# Patient Record
Sex: MALE | Race: WHITE | Employment: OTHER | URBAN - METROPOLITAN AREA
[De-identification: names, ages, dates, MRNs, and addresses within clinical notes are randomized per-mention and may not be internally consistent; named-entity substitution may affect disease eponyms.]

---

## 2017-03-01 ENCOUNTER — HOSPITAL ENCOUNTER (OUTPATIENT)
Dept: CARDIAC REHAB | Age: 82
Discharge: HOME | End: 2017-03-01

## 2017-03-08 ENCOUNTER — HOSPITAL ENCOUNTER (OUTPATIENT)
Dept: CARDIAC REHAB | Age: 82
Discharge: HOME | End: 2017-03-08
Payer: MEDICARE

## 2017-03-08 VITALS — BODY MASS INDEX: 26.04 KG/M2 | WEIGHT: 186 LBS | HEIGHT: 71 IN

## 2017-03-08 PROCEDURE — G0239 OTH RESP PROC, GROUP: HCPCS

## 2017-03-09 ENCOUNTER — APPOINTMENT (OUTPATIENT)
Dept: CARDIAC REHAB | Age: 82
End: 2017-03-09
Payer: MEDICARE

## 2017-03-14 ENCOUNTER — HOSPITAL ENCOUNTER (OUTPATIENT)
Dept: CARDIAC REHAB | Age: 82
Discharge: HOME | End: 2017-03-14
Payer: MEDICARE

## 2017-03-14 VITALS — BODY MASS INDEX: 25.8 KG/M2 | WEIGHT: 185 LBS

## 2017-03-14 PROCEDURE — G0239 OTH RESP PROC, GROUP: HCPCS

## 2017-03-16 ENCOUNTER — HOSPITAL ENCOUNTER (OUTPATIENT)
Dept: CARDIAC REHAB | Age: 82
Discharge: HOME | End: 2017-03-16
Payer: MEDICARE

## 2017-03-16 PROCEDURE — G0239 OTH RESP PROC, GROUP: HCPCS

## 2017-03-21 ENCOUNTER — HOSPITAL ENCOUNTER (OUTPATIENT)
Dept: CARDIAC REHAB | Age: 82
Discharge: HOME | End: 2017-03-21
Payer: MEDICARE

## 2017-03-21 VITALS — WEIGHT: 184 LBS | BODY MASS INDEX: 25.66 KG/M2

## 2017-03-21 PROCEDURE — G0239 OTH RESP PROC, GROUP: HCPCS

## 2017-03-23 ENCOUNTER — HOSPITAL ENCOUNTER (OUTPATIENT)
Dept: CARDIAC REHAB | Age: 82
Discharge: HOME | End: 2017-03-23
Payer: MEDICARE

## 2017-03-23 VITALS — BODY MASS INDEX: 25.66 KG/M2 | WEIGHT: 184 LBS

## 2017-03-23 PROCEDURE — G0239 OTH RESP PROC, GROUP: HCPCS

## 2017-03-28 ENCOUNTER — HOSPITAL ENCOUNTER (OUTPATIENT)
Dept: CARDIAC REHAB | Age: 82
Discharge: HOME | End: 2017-03-28
Payer: MEDICARE

## 2017-03-28 VITALS — WEIGHT: 183.8 LBS | BODY MASS INDEX: 25.63 KG/M2

## 2017-03-28 PROCEDURE — G0239 OTH RESP PROC, GROUP: HCPCS

## 2017-03-28 NOTE — PROGRESS NOTES
Nutrition Assessment:     Client History:  Current Medical Diagnosis: COPD, SOB, JACKY, afib, HTN, cholesterol     Pt reports 2 years ago attempting to lose weight and being successful going from 210# to 190#. He reports since then he has had unintentional weight loss and according to his home scales is 175#. He reports a desire to maintain his current weight and prevent additional weight loss. Recently his weight has been stable. Pt reports his appetite has been low compared to his normal and he seems to get full quickly. He has had high potassium levels and has been watching the potassium levels in foods. Dietary recall:  Breakfast: 9:30am cereal or toast/eggs or egg/grits coffee, prune juice  Lunch: 1-2pm sandwich (bolangna, ham, cheese, etc), or leftovers. Water, diet apple  Juice  Snack: occasionally a cookie  Dinner: 6:30pm stuffed bell peppers, [place] toast, salad. Chicken and potatoes, porchop and rice with salad, broccoli. Will eat out 2-3x/week - [place], [employer], [employer], etc.    Desserts: cake, pie, ice cream,     Anthropometric Data:  Ht: 71\"   Wt: 186#   Age: 80  BMI: 25.9kg/m2 normal for age  IBW: 184#   %IBW: 101%   % body fat:28.7%  Waist measurement: 40.5\"     Estimated Nutritional Needs:  Calories: 2031 (MSJ*1.3)  Protein:  66-83 grams (0.8-1.0g/kg IBW)    Nutrition Diagnosis:  Food and nutrition knowledge deficit related to limited education on diet and disease conditions as evidenced by pt report. Nutrition Intervention:  Nutrition Education:  - Educated pt on techniques for avoiding weight loss. Discussed relationship between pulmonary disease and unintentional weight loss. Reviewed eating slowly, small frequent meals, etc. As a means of encouraging intake. - Reviewed healthful dietary practices that assist with heart and lung health: high fiber, whole grains, fruits, veggies, lean protein, etc.    - Reviewed low sodium diet.   - Reviewed dietary improvements for lowering cholesterol     Handouts: Cholesterol Food Guide, COPD Nutrition Basics        Monitoring/Evaluation:  Follow-up Appointment: RD to follow up with pt during pulmonary rehab sessions for questions and assessment of progression towards goals.      Allan Mijares MS, RD, LD  C: [phone]

## 2017-03-30 ENCOUNTER — HOSPITAL ENCOUNTER (OUTPATIENT)
Dept: CARDIAC REHAB | Age: 82
Discharge: HOME | End: 2017-03-30
Payer: MEDICARE

## 2017-03-30 PROCEDURE — G0239 OTH RESP PROC, GROUP: HCPCS

## 2017-04-04 ENCOUNTER — HOSPITAL ENCOUNTER (OUTPATIENT)
Dept: CARDIAC REHAB | Age: 82
Discharge: HOME | End: 2017-04-04
Payer: MEDICARE

## 2017-04-04 VITALS — BODY MASS INDEX: 25.8 KG/M2 | WEIGHT: 185 LBS

## 2017-04-04 PROCEDURE — G0239 OTH RESP PROC, GROUP: HCPCS

## 2017-04-06 ENCOUNTER — HOSPITAL ENCOUNTER (OUTPATIENT)
Dept: CARDIAC REHAB | Age: 82
Discharge: HOME | End: 2017-04-06
Payer: MEDICARE

## 2017-04-06 PROCEDURE — G0239 OTH RESP PROC, GROUP: HCPCS

## 2017-04-11 ENCOUNTER — HOSPITAL ENCOUNTER (OUTPATIENT)
Dept: CARDIAC REHAB | Age: 82
Discharge: HOME | End: 2017-04-11
Payer: MEDICARE

## 2017-04-11 VITALS — BODY MASS INDEX: 26.01 KG/M2 | HEIGHT: 71 IN | WEIGHT: 185.8 LBS

## 2017-04-11 PROCEDURE — G0239 OTH RESP PROC, GROUP: HCPCS

## 2017-04-13 ENCOUNTER — HOSPITAL ENCOUNTER (OUTPATIENT)
Dept: CARDIAC REHAB | Age: 82
Discharge: HOME | End: 2017-04-13
Payer: MEDICARE

## 2017-04-13 PROCEDURE — G0239 OTH RESP PROC, GROUP: HCPCS

## 2017-04-18 ENCOUNTER — HOSPITAL ENCOUNTER (OUTPATIENT)
Dept: CARDIAC REHAB | Age: 82
Discharge: HOME | End: 2017-04-18
Payer: MEDICARE

## 2017-04-18 VITALS — BODY MASS INDEX: 26.08 KG/M2 | WEIGHT: 187 LBS

## 2017-04-18 PROCEDURE — G0239 OTH RESP PROC, GROUP: HCPCS

## 2017-04-20 ENCOUNTER — HOSPITAL ENCOUNTER (OUTPATIENT)
Dept: CARDIAC REHAB | Age: 82
Discharge: HOME | End: 2017-04-20
Payer: MEDICARE

## 2017-04-25 ENCOUNTER — APPOINTMENT (OUTPATIENT)
Dept: CARDIAC REHAB | Age: 82
End: 2017-04-25
Payer: MEDICARE

## 2018-02-13 ENCOUNTER — HOSPITAL ENCOUNTER (OUTPATIENT)
Dept: PHYSICAL THERAPY | Age: 83
Discharge: HOME | End: 2018-02-13
Payer: MEDICARE

## 2018-02-13 PROCEDURE — G8978 MOBILITY CURRENT STATUS: HCPCS

## 2018-02-13 PROCEDURE — 97110 THERAPEUTIC EXERCISES: CPT

## 2018-02-13 PROCEDURE — 97161 PT EVAL LOW COMPLEX 20 MIN: CPT

## 2018-02-13 PROCEDURE — G8979 MOBILITY GOAL STATUS: HCPCS

## 2018-02-13 NOTE — THERAPY EVALUATION
Flower Leung  : 1933  [place] / [place] / Product Type: Medicare /  [address]  at [facility]  [address] , [address], [address] [place] [address]  [phone]   [phone]       OUTPATIENT PHYSICAL THERAPY:Initial Assessment 2018    ICD-10: Treatment Diagnosis: Pain in right knee (M25.561), Muscle weakness (generalized) (M62.81)  Precautions/Allergies:   Neuromuscular blockers, steroidal and Sulfa (sulfonamide antibiotics)   Fall Risk Score: 2 (? 5 = High Risk)  MD Orders: PT eval and treat MEDICAL/REFERRING DIAGNOSIS:  KNEE, RIGHT   DATE OF ONSET: 2-3 months ago  REFERRING PHYSICIAN: Miguel Angel Bain MD  RETURN PHYSICIAN APPOINTMENT: not specified     ASSESSMENT:  Mr. Ascencion Guzman presents with recent onset of R knee pain. He has been treated with injections and reports he is feeling about 80-85% back to normal.  Pt demonstrates decreased muscle strength, slight difficulty with stairs and mild difficulty with repeated sit<>stand. Pt has COPD and also demonstrates decreased endurance. Pt will benefit from 1 visit to review and solidify his HEP. PROBLEM LIST (Impacting functional limitations):  1. Decreased Strength  2. Decreased ADL/Functional Activities  3. Increased Pain  4. Increased Fatigue  5. Increased Shortness of Breath INTERVENTIONS PLANNED:  1. Balance Exercise  2. Cold  3. Cryotherapy  4. Heat  5. Home Exercise Program (HEP)  6. Neuromuscular Re-education/Strengthening  7. Range of Motion (ROM)  8. Therapeutic Activites  9. Therapeutic Exercise/Strengthening   TREATMENT PLAN:  Effective Dates: 18 TO 3/31/18. Frequency/Duration: 1 visit  GOALS: (Goals have been discussed and agreed upon with patient.)  Short-Term Functional Goals = Discharge Goals: Time Frame: 1 visit  1. Pt will participate in 1 PT visit for education on home exercises  2. Pt will be I and compliant with long term HEP  3.  Pt will demonstrate proper form with sit<>stand  Rehabilitation Potential For Stated Goals: Good  Regarding Ryan Peck's therapy, I certify that the treatment plan above will be carried out by a therapist or under their direction. Thank you for this referral,  Gail Gomes PT                 The information in this section was collected on 2/13/18 (except where otherwise noted). HISTORY:   History of Present Injury/Illness (Reason for Referral): Dakota Bell presents with R knee pain for the last 2-3 months, insidious onset. Saw PCP and then ortho, had x-rays and pt reports he was dx with arthritis. Pt reports he had an injection approx 1 week ago and it's about 80-85% better. Pt is interested in home exercise program.    Past Medical History/Comorbidities:   Mr. Arabella Lopez  has a past medical history of Atrial fibrillation ([facility] [address] Cancer ([address] Chronic obstructive pulmonary disease (HCC); COPD (chronic obstructive pulmonary disease) ([address] (5/13/2016); Erectile dysfunction; Essential hypertension (5/13/2016); Hypercholesterolemia; Hyperlipidemia (5/13/2016); Hypertension; Malignant neoplasm of prostate ([address] (5/13/2016); Paroxysmal atrial fibrillation ([address] (5/13/2016); Peripheral vascular disease ([address] Shortness of breath dyspnea (5/13/2016); and Unspecified sleep apnea. Mr. Arabella Lopez  has a past surgical history that includes hx vasectomy; hx orthopaedic (1996); and hx colonoscopy.     Social History/Living Environment:   Lives with wife    Prior Level of Function/Work/Activity: Retired - does most of housework and cooking due to wife being in poor health; enjoys bowling, walks his dog    Functional Limitations: Sit<>stand, standing from toilet    Current Medications:       Current Outpatient Prescriptions:     MULTAQ tab tablet, TAKE 1 TABLET TWICE A DAY WITH MEALS, Disp: 180 Tab, Rfl: 2    fluocinonide (LIDEX) 0.05 % topical gel, Apply  to affected area two (2) times a day., Disp: , Rfl:     bimatoprost (LUMIGAN) 0.01 % ophthalmic drops, Administer 1 Drop to both eyes every evening., Disp: , Rfl:     atorvastatin (LIPITOR) 40 mg tablet, Take  by mouth daily. , Disp: , Rfl:     amLODIPine-benazepril (LOTREL) 5-10 mg per capsule, Take 1 Cap by mouth daily. Indications: 2.5-10 mg qd, Disp: , Rfl:     rivaroxaban (XARELTO) 20 mg tab tablet, Take  by mouth daily. , Disp: , Rfl:     albuterol (PROAIR HFA) 90 mcg/actuation inhaler, Take  by inhalation. , Disp: , Rfl:     fluticasone-salmeterol (ADVAIR DISKUS) 250-50 mcg/dose diskus inhaler, Take 1 puff by inhalation every twelve (12) hours. , Disp: , Rfl:     tiotropium (SPIRIVA WITH HANDIHALER) 18 mcg inhalation capsule, Take 1 capsule by inhalation daily. , Disp: , Rfl:    Date Last Reviewed:  2/13/2018     Number of Personal Factors/Comorbidities that affect the Plan of Care: 0: LOW COMPLEXITY   EXAMINATION:     ROM: B LEs WNL    Strength:   LE MMT R L   Hip Flexion 5 5   Hip ER 4 4   Hip Abduction 4+ 4+   Hip Extension 3+ 3+   Knee Flexion 5 5   Knee Extension 4 5     Functional Measures:  Stairs: slight apprehension with leading R and descending L. Impaired endurance noted due to COPD. Body Structures Involved:  1. Joints  2. Muscles Body Functions Affected:  1. Neuromusculoskeletal  2. Movement Related Activities and Participation Affected:  1. General Tasks and Demands  2. Mobility   Number of elements that affect the Plan of Care: 4+: HIGH COMPLEXITY   CLINICAL PRESENTATION:   Presentation: Stable and uncomplicated: LOW COMPLEXITY   CLINICAL DECISION MAKING:   Outcome Measure: Tool Used: Lower Extremity Functional Scale (LEFS)  Score:  Initial: 55/80 Most Recent: X/80 (Date: -- )   Interpretation of Score: 20 questions each scored on a 5 point scale with 0 representing \"extreme difficulty or unable to perform\" and 4 representing \"no difficulty\". The lower the score, the greater the functional disability. 80/80 represents no disability.   Minimal detectable change is 9 points. Score 80 79-63 62-48 47-32 31-16 15-1 0   Modifier CH CI CJ CK CL CM CN     ? Mobility - Walking and Moving Around:     - CURRENT STATUS: CJ - 20%-39% impaired, limited or restricted    - GOAL STATUS: CJ - 20%-39% impaired, limited or restricted    - D/C STATUS:  ---------------To be determined---------------      Medical Necessity:   · Patient demonstrates good rehab potential due to decreased strength and higher previous functional level. Reason for Services/Other Comments:  · Patient continues to require skilled intervention due to decreased functional status. Use of outcome tool(s) and clinical judgement create a POC that gives a: Clear prediction of patient's progress: LOW COMPLEXITY            TREATMENT:   (In addition to Assessment/Re-Assessment sessions the following treatments were rendered)      Present Symptoms/Complaints - 2/13/2018:  See hx  Pain: Initial:     0/10 Post Session:  0/10       Therapeutic Exercise: (15 minutes):  Exercises per grid below to improve mobility, strength and balance. Required moderate visual, verbal and tactile cues to promote proper body alignment, promote proper body posture and promote proper body mechanics. Progressed resistance, range and repetitions as indicated. Date:  2/13/18 Date:   Date:     Activity/Exercise Parameters Parameters Parameters   Pt education Findings, anatomy/pathology, POC, HEP     Sit<>stand 10x     Clamshell 20x     Bridge 20x     Seated LAQ GTB x20                   Treatment/Session Assessment:  Pt understood educational interventions and agreed with plan. · Compliance with Program/Exercises: Will assess as treatment progresses. · Recommendations/Intent for next treatment session: Next visit will focus on advancements to more challenging activities.   · Variance from plan of care: None    Total Treatment Duration:  PT Patient Time In/Time Out  Time In: 1345  Time Out: [address], PT

## 2018-02-13 NOTE — PROGRESS NOTES
Ambulatory/Rehab Services H2 Model Falls Risk Assessment    Risk Factor Pts. ·   Confusion/Disorientation/Impulsivity  []    4 ·   Symptomatic Depression  []   2 ·   Altered Elimination  []   1 ·   Dizziness/Vertigo  []   1 ·   Gender (Male)  [x]   1 ·   Any administered antiepileptics (anticonvulsants):  []   2 ·   Any administered benzodiazepines:  []   1 ·   Visual Impairment (specify):  []   1 ·   Portable Oxygen Use  []   1 ·   Orthostatic ? BP  []   1 ·   History of Recent Falls (within 3 mos.)  []   5     Ability to Rise from Chair (choose one) Pts. ·   Ability to rise in a single movement  []   0 ·   Pushes up, successful in one attempt  [x]   1 ·   Multiple attempts, but successful  []   3 ·   Unable to rise without assistance  []   4   Total: (5 or greater = High Risk) 2     Falls Prevention Plan:   []                Physical Limitations to Exercise (specify):   []                Mobility Assistance Device (type):   []                Exercise/Equipment Adaptation (specify):    ©2010 [facility] [address] Patent #[id number].  Federal Law prohibits the replication, distribution or use without written permission from [facility] [employer]

## 2018-02-20 ENCOUNTER — HOSPITAL ENCOUNTER (OUTPATIENT)
Dept: PHYSICAL THERAPY | Age: 83
Discharge: HOME | End: 2018-02-20
Payer: MEDICARE

## 2018-02-20 NOTE — THERAPY DISCHARGE
Jenny Meredith  : 1933  [place] / [place] / Product Type: Medicare /  [address]  at [facility]  [address], [address], [address] [place] [address]  [phone]   [phone]       OUTPATIENT PHYSICAL THERAPY:Discontinuation Summary 2018    ICD-10: Treatment Diagnosis: Pain in right knee (M25.561), Muscle weakness (generalized) (M62.81)  Precautions/Allergies:   Neuromuscular blockers, steroidal and Sulfa (sulfonamide antibiotics)   Fall Risk Score: 2 (? 5 = High Risk)  MD Orders: PT eval and treat MEDICAL/REFERRING DIAGNOSIS:  KNEE, RIGHT   DATE OF ONSET: 2-3 months ago  REFERRING PHYSICIAN: Tonio Xiong MD  RETURN PHYSICIAN APPOINTMENT: not specified     ASSESSMENT:  Mr. Nikolas Yanez participated in PT evaluation on 18 and was given HEP. He presented to therapy for his appointment today, however he stated he has been having stomach issues today and declined performing or reviewing exercises. Pt states his knee is feeling great and he thinks the injection and the exercises have helped. Pt filled out LEFS today and score has gone up from 55/80 to 70/80. Pt is discharged from PT. TREATMENT PLAN:  Effective Dates: 18 TO 3/31/18. Frequency/Duration: 1 visit  GOALS: (Goals have been discussed and agreed upon with patient.)  Short-Term Functional Goals = Discharge Goals: Time Frame: 1 visit  1. Pt will participate in 1 PT visit for education on home exercises - met  2. Pt will be I and compliant with long term HEP - met  3. Pt will demonstrate proper form with sit<>stand - unable to assess      Outcome Measure: Tool Used: Lower Extremity Functional Scale (LEFS)  Score:  Initial: 55/80 Most Recent: 70/80 (Date: 18 )   Interpretation of Score: 20 questions each scored on a 5 point scale with 0 representing \"extreme difficulty or unable to perform\" and 4 representing \"no difficulty\".   The lower the score, the greater the functional disability. 80/80 represents no disability. Minimal detectable change is 9 points.

## 2019-06-04 ENCOUNTER — HOSPITAL ENCOUNTER (OUTPATIENT)
Dept: PHYSICAL THERAPY | Age: 84
Discharge: HOME | End: 2019-06-04
Payer: MEDICARE

## 2019-06-04 PROCEDURE — 97161 PT EVAL LOW COMPLEX 20 MIN: CPT

## 2019-06-04 NOTE — THERAPY EVALUATION
Bryant Rodolfo  : 1933  [place]  Secondary: [address] [facility]  [address] , [address], [address]  [phone]   [phone]          OUTPATIENT PHYSICAL THERAPY:Initial Assessment and Discharge Summary 2019   ICD-10: Treatment Diagnosis: Difficulty in walking, not elsewhere classified (R26.2); Left hip pain (M25.552)  Precautions/Allergies:   Clarithromycin; Neuromuscular blockers, steroidal; and Sulfa (sulfonamide antibiotics)   TREATMENT PLAN:  Effective Dates: 2019 TO Today (2019) MEDICAL/REFERRING DIAGNOSIS:  Left hip pain [M25.552]   DATE OF ONSET:  onset of chronic pain   REFERRING PHYSICIAN: Jimena Barbosa MD MD Orders: evaluate and treat  Return MD Appointment: 2019     INITIAL ASSESSMENT:  Mr. Martir Wagner presents in therapy today with signs and symptoms indicating piriformis dysfunction. He states he is unable to attend formal PT due to being primary caretaker of his wife, so he received a comprehensive HEP focused on piriformis mobility. PROBLEM LIST (Impacting functional limitations):  1. Decreased ADL/Functional Activities  2. Decreased Ambulation Ability/Technique  3. Increased Pain  4. Decreased Activity Tolerance  5. Decreased Flexibility/Joint Mobility  6. Decreased Knowledge of Precautions  7. Decreased [place] with Home Exercise Program INTERVENTIONS PLANNED: (Treatment may consist of any combination of the following)  1. Home Exercise Program (HEP)     GOALS: (Goals have been discussed and agreed upon with patient.)  Discharge Goals:   1. Patient will verbalize and demonstrate understanding of HEP focused on piriformis mobility for optimum performance of ADLs and functional mobility.      Total Duration:  PT Patient Time In/Time Out  Time In: 1345  Time Out: 1430    Rehabilitation Potential For Stated Goals: Good  Regarding Daniel Peck's therapy, I certify that the treatment plan above will be carried out by a therapist or under their direction. Thank you for this referral,  Evie Teague PT, DPT     Referring Physician Signature: Jelani Ontiveros MD _______________________________ Date _____________     PAIN/SUBJECTIVE:   Initial: Pain Intensity 1: 2  Pain Location 1: Hip  Pain Orientation 1: Left  Post Session:  2/10   HISTORY:   History of Injury/Illness (Reason for Referral):  Patient has very chronic history of hip and back pain. Recent onset in April 2019, while lifting heavy objects. Past Medical History/Comorbidities:   Mr. Glenn Mehta  has a past medical history of Atrial fibrillation ([address] Cancer ([address] Chronic obstructive pulmonary disease ([address] COPD (chronic obstructive pulmonary disease) ([address] (5/13/2016), Erectile dysfunction, Essential hypertension (5/13/2016), Hypercholesterolemia, Hyperlipidemia (5/13/2016), Hypertension, Malignant neoplasm of prostate ([address] (5/13/2016), Paroxysmal atrial fibrillation ([address] (5/13/2016), Peripheral vascular disease ([address] Shortness of breath dyspnea (5/13/2016), and Unspecified sleep apnea. He also has no past medical history of Aneurysm ([address] Arthritis, Asthma, Autoimmune disease ([address] CAD (coronary artery disease), Chronic kidney disease, Chronic pain, Coagulation disorder ([address] Diabetes ([address] Endocarditis, GERD (gastroesophageal reflux disease), Heart failure ([address] Liver disease, Morbid obesity ([address] Psychiatric disorder, PUD (peptic ulcer disease), Rheumatic fever, Seizures ([address] Stroke ([address] Thromboembolus ([address] or Thyroid disease. Mr. Glenn Mehta  has a past surgical history that includes hx vasectomy; hx orthopaedic (1996); and hx colonoscopy. Social History/Living Environment:     Independent - states he must take care of his wife who has dementia and blindness   Prior Level of Function/Work/Activity:  Retired   Personal Factors:          Sex:  male        Age:  80 y.o.    Ambulatory/Rehab Services H2 Model Falls Risk Assessment   Risk Factors:       (1)  Gender [Male] Ability to Rise from Chair:       (0)  Ability to rise in a single movement   Falls Prevention Plan:       No modifications necessary   Total: (5 or greater = High Risk): 1   ©2010 [facility]. All Rights Reserved. [facility] Patent #[id number]. Federal Law prohibits the replication, distribution or use without written permission from [facility] [employer]   Current Medications:       Current Outpatient Medications:     dronedarone (MULTAQ) tab tablet, TAKE 1 TABLET TWICE A DAY WITH MEALS, Disp: 180 Tab, Rfl: 3    apixaban (ELIQUIS) 2.5 mg tablet, Take 2.5 mg by mouth two (2) times a day., Disp: , Rfl:     naproxen sodium (ALEVE) 220 mg cap, Take  by mouth., Disp: , Rfl:     amLODIPine-benazepril (LOTREL) 2.5-10 mg per capsule, Take 1 Cap by mouth every other day., Disp: , Rfl:     fluocinonide (LIDEX) 0.05 % topical gel, Apply  to affected area two (2) times a day., Disp: , Rfl:     bimatoprost (LUMIGAN) 0.01 % ophthalmic drops, Administer 1 Drop to both eyes every evening., Disp: , Rfl:     atorvastatin (LIPITOR) 40 mg tablet, Take  by mouth daily. , Disp: , Rfl:     albuterol (PROAIR HFA) 90 mcg/actuation inhaler, Take  by inhalation. , Disp: , Rfl:     fluticasone-salmeterol (ADVAIR DISKUS) 250-50 mcg/dose diskus inhaler, Take 1 puff by inhalation every twelve (12) hours. , Disp: , Rfl:     tiotropium (SPIRIVA WITH HANDIHALER) 18 mcg inhalation capsule, Take 1 capsule by inhalation daily. , Disp: , Rfl:    Date Last Reviewed:  6/5/2019     Number of Personal Factors/Comorbidities that affect the Plan of Care: 0: LOW COMPLEXITY   EXAMINATION:   Observation/Orthostatic Postural Assessment:          Patient appears in healthy condition, rises well from seated position   Palpation:          Tenderness over R gluteal and piriformis region   ROM:          Hip flexion and abduction are [place] [place] on RLE  Strength: 4+/5 hip flexion, 4/5 hip abduction on R  Special Tests:          Passive piriformis loading/stretching reproduces patient's symptoms    Body Structures Involved:  1. Nerves  2. Joints  3. Muscles Body Functions Affected:  1. Sensory/Pain  2. Movement Related Activities and Participation Affected:  1. General Tasks and Demands  2. Mobility  3. Interpersonal Interactions and Relationships  4.  Community, Social and [place]   Number of elements (examined above) that affect the Plan of Care: 1-2: LOW COMPLEXITY   CLINICAL PRESENTATION:   Presentation: Stable and uncomplicated: LOW COMPLEXITY   CLINICAL DECISION MAKING:   Use of outcome tool(s) and clinical judgement create a POC that gives a: Clear prediction of patient's progress: LOW COMPLEXITY

## 2019-07-16 ENCOUNTER — HOSPITAL ENCOUNTER (OUTPATIENT)
Dept: LAB | Age: 84
Discharge: HOME | End: 2019-07-16
Payer: MEDICARE

## 2019-07-16 DIAGNOSIS — I48.0 PAROXYSMAL ATRIAL FIBRILLATION (HCC): ICD-10-CM

## 2019-07-16 LAB
ANION GAP SERPL CALC-SCNC: 9 MMOL/L (ref 7–16)
BASOPHILS # BLD: 0.1 K/UL (ref 0–0.2)
BASOPHILS NFR BLD: 1 % (ref 0–2)
BUN SERPL-MCNC: 41 MG/DL (ref 8–23)
CALCIUM SERPL-MCNC: 8.7 MG/DL (ref 8.3–10.4)
CHLORIDE SERPL-SCNC: 110 MMOL/L (ref 98–107)
CO2 SERPL-SCNC: 24 MMOL/L (ref 21–32)
CREAT SERPL-MCNC: 2.3 MG/DL (ref 0.8–1.5)
DIFFERENTIAL METHOD BLD: ABNORMAL
EOSINOPHIL # BLD: 0.6 K/UL (ref 0–0.8)
EOSINOPHIL NFR BLD: 8 % (ref 0.5–7.8)
ERYTHROCYTE [DISTWIDTH] IN BLOOD BY AUTOMATED COUNT: 14.4 % (ref 11.9–14.6)
GLUCOSE SERPL-MCNC: 79 MG/DL (ref 65–100)
HCT VFR BLD AUTO: 37.7 % (ref 41.1–50.3)
HGB BLD-MCNC: 12.3 G/DL (ref 13.6–17.2)
IMM GRANULOCYTES # BLD AUTO: 0 K/UL (ref 0–0.5)
IMM GRANULOCYTES NFR BLD AUTO: 0 % (ref 0–5)
LYMPHOCYTES # BLD: 1.4 K/UL (ref 0.5–4.6)
LYMPHOCYTES NFR BLD: 21 % (ref 13–44)
MAGNESIUM SERPL-MCNC: 1.9 MG/DL (ref 1.8–2.4)
MCH RBC QN AUTO: 30.8 PG (ref 26.1–32.9)
MCHC RBC AUTO-ENTMCNC: 32.6 G/DL (ref 31.4–35)
MCV RBC AUTO: 94.3 FL (ref 79.6–97.8)
MONOCYTES # BLD: 0.7 K/UL (ref 0.1–1.3)
MONOCYTES NFR BLD: 9 % (ref 4–12)
NEUTS SEG # BLD: 4.2 K/UL (ref 1.7–8.2)
NEUTS SEG NFR BLD: 61 % (ref 43–78)
NRBC # BLD: 0 K/UL (ref 0–0.2)
PLATELET # BLD AUTO: 199 K/UL (ref 150–450)
PMV BLD AUTO: 9.6 FL (ref 9.4–12.3)
POTASSIUM SERPL-SCNC: 4.7 MMOL/L (ref 3.5–5.1)
RBC # BLD AUTO: 4 M/UL (ref 4.23–5.6)
SODIUM SERPL-SCNC: 143 MMOL/L (ref 136–145)
WBC # BLD AUTO: 6.9 K/UL (ref 4.3–11.1)

## 2019-07-16 PROCEDURE — 80048 BASIC METABOLIC PNL TOTAL CA: CPT

## 2019-07-16 PROCEDURE — 36415 COLL VENOUS BLD VENIPUNCTURE: CPT

## 2019-07-16 PROCEDURE — 85025 COMPLETE CBC W/AUTO DIFF WBC: CPT

## 2019-07-16 PROCEDURE — 83735 ASSAY OF MAGNESIUM: CPT

## 2019-07-19 ENCOUNTER — HOSPITAL ENCOUNTER (OUTPATIENT)
Age: 84
Setting detail: OBSERVATION
Discharge: HOME | End: 2019-07-20
Attending: INTERNAL MEDICINE | Admitting: INTERNAL MEDICINE
Payer: MEDICARE

## 2019-07-19 ENCOUNTER — ANESTHESIA (OUTPATIENT)
Dept: SURGERY | Age: 84
End: 2019-07-19
Payer: MEDICARE

## 2019-07-19 ENCOUNTER — ANESTHESIA EVENT (OUTPATIENT)
Dept: SURGERY | Age: 84
End: 2019-07-19
Payer: MEDICARE

## 2019-07-19 ENCOUNTER — APPOINTMENT (OUTPATIENT)
Dept: GENERAL RADIOLOGY | Age: 84
End: 2019-07-19
Attending: INTERNAL MEDICINE
Payer: MEDICARE

## 2019-07-19 PROBLEM — R00.1 SYMPTOMATIC BRADYCARDIA: Status: ACTIVE | Noted: 2019-07-19

## 2019-07-19 PROBLEM — Z98.890 S/P ATRIOVENTRICULAR NODAL ABLATION: Status: ACTIVE | Noted: 2019-07-19

## 2019-07-19 LAB
ANION GAP SERPL CALC-SCNC: 7 MMOL/L (ref 7–16)
ATRIAL RATE: 234 BPM
BUN SERPL-MCNC: 32 MG/DL (ref 8–23)
CALCIUM SERPL-MCNC: 8.9 MG/DL (ref 8.3–10.4)
CALCULATED R AXIS, ECG10: 9 DEGREES
CALCULATED T AXIS, ECG11: 33 DEGREES
CHLORIDE SERPL-SCNC: 111 MMOL/L (ref 98–107)
CO2 SERPL-SCNC: 25 MMOL/L (ref 21–32)
CREAT SERPL-MCNC: 1.98 MG/DL (ref 0.8–1.5)
DIAGNOSIS, 93000: NORMAL
ERYTHROCYTE [DISTWIDTH] IN BLOOD BY AUTOMATED COUNT: 14.1 % (ref 11.9–14.6)
GLUCOSE SERPL-MCNC: 79 MG/DL (ref 65–100)
HCT VFR BLD AUTO: 39.2 % (ref 41.1–50.3)
HGB BLD-MCNC: 13.1 G/DL (ref 13.6–17.2)
INR PPP: 1
MAGNESIUM SERPL-MCNC: 2.2 MG/DL (ref 1.8–2.4)
MCH RBC QN AUTO: 31.1 PG (ref 26.1–32.9)
MCHC RBC AUTO-ENTMCNC: 33.4 G/DL (ref 31.4–35)
MCV RBC AUTO: 93.1 FL (ref 79.6–97.8)
NRBC # BLD: 0 K/UL (ref 0–0.2)
PLATELET # BLD AUTO: 204 K/UL (ref 150–450)
PMV BLD AUTO: 9.6 FL (ref 9.4–12.3)
POTASSIUM SERPL-SCNC: 4.9 MMOL/L (ref 3.5–5.1)
PROTHROMBIN TIME: 13.1 SEC (ref 11.7–14.5)
Q-T INTERVAL, ECG07: 400 MS
QRS DURATION, ECG06: 90 MS
QTC CALCULATION (BEZET), ECG08: 446 MS
RBC # BLD AUTO: 4.21 M/UL (ref 4.23–5.6)
SODIUM SERPL-SCNC: 143 MMOL/L (ref 136–145)
VENTRICULAR RATE, ECG03: 75 BPM
WBC # BLD AUTO: 6.7 K/UL (ref 4.3–11.1)

## 2019-07-19 PROCEDURE — 74011000250 HC RX REV CODE- 250: Performed by: INTERNAL MEDICINE

## 2019-07-19 PROCEDURE — 33208 INSRT HEART PM ATRIAL & VENT: CPT

## 2019-07-19 PROCEDURE — 74011250636 HC RX REV CODE- 250/636

## 2019-07-19 PROCEDURE — 99218 HC RM OBSERVATION: CPT

## 2019-07-19 PROCEDURE — 85027 COMPLETE CBC AUTOMATED: CPT

## 2019-07-19 PROCEDURE — C1898 LEAD, PMKR, OTHER THAN TRANS: HCPCS

## 2019-07-19 PROCEDURE — A4565 SLINGS: HCPCS

## 2019-07-19 PROCEDURE — 74011250637 HC RX REV CODE- 250/637: Performed by: INTERNAL MEDICINE

## 2019-07-19 PROCEDURE — 94640 AIRWAY INHALATION TREATMENT: CPT

## 2019-07-19 PROCEDURE — C1785 PMKR, DUAL, RATE-RESP: HCPCS

## 2019-07-19 PROCEDURE — C1892 INTRO/SHEATH,FIXED,PEEL-AWAY: HCPCS

## 2019-07-19 PROCEDURE — 94760 N-INVAS EAR/PLS OXIMETRY 1: CPT

## 2019-07-19 PROCEDURE — 77030013687 HC GD NDL BARD -B

## 2019-07-19 PROCEDURE — 93005 ELECTROCARDIOGRAM TRACING: CPT | Performed by: INTERNAL MEDICINE

## 2019-07-19 PROCEDURE — 76060000033 HC ANESTHESIA 1 TO 1.5 HR: Performed by: INTERNAL MEDICINE

## 2019-07-19 PROCEDURE — 80048 BASIC METABOLIC PNL TOTAL CA: CPT

## 2019-07-19 PROCEDURE — 83735 ASSAY OF MAGNESIUM: CPT

## 2019-07-19 PROCEDURE — 74011250636 HC RX REV CODE- 250/636: Performed by: INTERNAL MEDICINE

## 2019-07-19 PROCEDURE — 71045 X-RAY EXAM CHEST 1 VIEW: CPT

## 2019-07-19 PROCEDURE — 77030012935 HC DRSG AQUACEL BMS -B

## 2019-07-19 PROCEDURE — 85610 PROTHROMBIN TIME: CPT

## 2019-07-19 RX ORDER — OXYCODONE AND ACETAMINOPHEN 10; 325 MG/1; MG/1
1 TABLET ORAL AS NEEDED
Status: CANCELLED | OUTPATIENT
Start: 2019-07-19

## 2019-07-19 RX ORDER — PROPOFOL 10 MG/ML
INJECTION, EMULSION INTRAVENOUS
Status: DISCONTINUED | OUTPATIENT
Start: 2019-07-19 | End: 2019-07-19 | Stop reason: HOSPADM

## 2019-07-19 RX ORDER — CEFAZOLIN SODIUM/WATER 2 G/20 ML
2 SYRINGE (ML) INTRAVENOUS EVERY 8 HOURS
Status: COMPLETED | OUTPATIENT
Start: 2019-07-20 | End: 2019-07-20

## 2019-07-19 RX ORDER — OXYCODONE HYDROCHLORIDE 5 MG/1
5 TABLET ORAL
Status: CANCELLED | OUTPATIENT
Start: 2019-07-19 | End: 2019-07-20

## 2019-07-19 RX ORDER — ACETAMINOPHEN 325 MG/1
650 TABLET ORAL
Status: DISCONTINUED | OUTPATIENT
Start: 2019-07-19 | End: 2019-07-20 | Stop reason: HOSPADM

## 2019-07-19 RX ORDER — PROPOFOL 10 MG/ML
INJECTION, EMULSION INTRAVENOUS AS NEEDED
Status: DISCONTINUED | OUTPATIENT
Start: 2019-07-19 | End: 2019-07-19 | Stop reason: HOSPADM

## 2019-07-19 RX ORDER — HYDROMORPHONE HYDROCHLORIDE 2 MG/ML
0.5 INJECTION, SOLUTION INTRAMUSCULAR; INTRAVENOUS; SUBCUTANEOUS
Status: CANCELLED | OUTPATIENT
Start: 2019-07-19

## 2019-07-19 RX ORDER — BENAZEPRIL HYDROCHLORIDE 10 MG/1
10 TABLET ORAL DAILY
Status: DISCONTINUED | OUTPATIENT
Start: 2019-07-20 | End: 2019-07-20 | Stop reason: HOSPADM

## 2019-07-19 RX ORDER — SODIUM CHLORIDE, SODIUM LACTATE, POTASSIUM CHLORIDE, CALCIUM CHLORIDE 600; 310; 30; 20 MG/100ML; MG/100ML; MG/100ML; MG/100ML
75 INJECTION, SOLUTION INTRAVENOUS CONTINUOUS
Status: CANCELLED | OUTPATIENT
Start: 2019-07-19 | End: 2019-07-20

## 2019-07-19 RX ORDER — SODIUM CHLORIDE 0.9 % (FLUSH) 0.9 %
5-40 SYRINGE (ML) INJECTION EVERY 8 HOURS
Status: DISCONTINUED | OUTPATIENT
Start: 2019-07-19 | End: 2019-07-20 | Stop reason: HOSPADM

## 2019-07-19 RX ORDER — LATANOPROST 50 UG/ML
1 SOLUTION/ DROPS OPHTHALMIC
Status: DISCONTINUED | OUTPATIENT
Start: 2019-07-19 | End: 2019-07-19 | Stop reason: SDUPTHER

## 2019-07-19 RX ORDER — BUDESONIDE 0.5 MG/2ML
500 INHALANT ORAL
Status: DISCONTINUED | OUTPATIENT
Start: 2019-07-19 | End: 2019-07-20 | Stop reason: HOSPADM

## 2019-07-19 RX ORDER — SODIUM CHLORIDE 0.9 % (FLUSH) 0.9 %
5-40 SYRINGE (ML) INJECTION EVERY 8 HOURS
Status: CANCELLED | OUTPATIENT
Start: 2019-07-19

## 2019-07-19 RX ORDER — SODIUM CHLORIDE 0.9 % (FLUSH) 0.9 %
5-40 SYRINGE (ML) INJECTION AS NEEDED
Status: CANCELLED | OUTPATIENT
Start: 2019-07-19

## 2019-07-19 RX ORDER — CEFAZOLIN SODIUM/WATER 2 G/20 ML
2 SYRINGE (ML) INTRAVENOUS
Status: COMPLETED | OUTPATIENT
Start: 2019-07-19 | End: 2019-07-19

## 2019-07-19 RX ORDER — ALBUTEROL SULFATE 0.83 MG/ML
2.5 SOLUTION RESPIRATORY (INHALATION)
Status: DISCONTINUED | OUTPATIENT
Start: 2019-07-19 | End: 2019-07-20 | Stop reason: HOSPADM

## 2019-07-19 RX ORDER — BUPIVACAINE HYDROCHLORIDE AND EPINEPHRINE 5; 5 MG/ML; UG/ML
60 INJECTION, SOLUTION EPIDURAL; INTRACAUDAL; PERINEURAL ONCE
Status: COMPLETED | OUTPATIENT
Start: 2019-07-19 | End: 2019-07-19

## 2019-07-19 RX ORDER — HYDROCODONE BITARTRATE AND ACETAMINOPHEN 5; 325 MG/1; MG/1
1 TABLET ORAL
Status: DISCONTINUED | OUTPATIENT
Start: 2019-07-19 | End: 2019-07-20 | Stop reason: HOSPADM

## 2019-07-19 RX ORDER — SODIUM CHLORIDE 0.9 % (FLUSH) 0.9 %
5-40 SYRINGE (ML) INJECTION AS NEEDED
Status: DISCONTINUED | OUTPATIENT
Start: 2019-07-19 | End: 2019-07-20 | Stop reason: HOSPADM

## 2019-07-19 RX ORDER — SODIUM CHLORIDE, SODIUM LACTATE, POTASSIUM CHLORIDE, CALCIUM CHLORIDE 600; 310; 30; 20 MG/100ML; MG/100ML; MG/100ML; MG/100ML
INJECTION, SOLUTION INTRAVENOUS
Status: DISCONTINUED | OUTPATIENT
Start: 2019-07-19 | End: 2019-07-19 | Stop reason: HOSPADM

## 2019-07-19 RX ORDER — LISINOPRIL 5 MG/1
10 TABLET ORAL DAILY
Status: DISCONTINUED | OUTPATIENT
Start: 2019-07-20 | End: 2019-07-19 | Stop reason: SDUPTHER

## 2019-07-19 RX ADMIN — BUDESONIDE 500 MCG: 0.5 INHALANT RESPIRATORY (INHALATION) at 21:21

## 2019-07-19 RX ADMIN — PROPOFOL 75 MCG/KG/MIN: 10 INJECTION, EMULSION INTRAVENOUS at 16:23

## 2019-07-19 RX ADMIN — PROPOFOL 30 MG: 10 INJECTION, EMULSION INTRAVENOUS at 16:22

## 2019-07-19 RX ADMIN — SODIUM CHLORIDE, SODIUM LACTATE, POTASSIUM CHLORIDE, CALCIUM CHLORIDE: 600; 310; 30; 20 INJECTION, SOLUTION INTRAVENOUS at 16:16

## 2019-07-19 RX ADMIN — ALBUTEROL SULFATE 2.5 MG: 2.5 SOLUTION RESPIRATORY (INHALATION) at 21:21

## 2019-07-19 RX ADMIN — Medication 10 ML: at 22:08

## 2019-07-19 RX ADMIN — Medication 2 G: at 16:27

## 2019-07-19 RX ADMIN — ACETAMINOPHEN 650 MG: 325 TABLET, FILM COATED ORAL at 22:08

## 2019-07-19 NOTE — ANESTHESIA POSTPROCEDURE EVALUATION
Procedure(s):  PACEMAKER.    total IV anesthesia    Anesthesia Post Evaluation      Multimodal analgesia: multimodal analgesia used between 6 hours prior to anesthesia start to PACU discharge  Patient location during evaluation: PACU  Patient participation: complete - patient participated  Level of consciousness: awake  Pain management: adequate  Airway patency: patent  Anesthetic complications: no  Cardiovascular status: acceptable  Respiratory status: spontaneous ventilation and acceptable  Hydration status: acceptable  Post anesthesia nausea and vomiting:  none      No vitals data found for the desired time range.

## 2019-07-19 NOTE — PROGRESS NOTES
TRANSFER - OUT REPORT:    Verbal report given to Shayla Juarez (name) on Debra Boop  being transferred to room 307 (unit) for routine progression of care       Report consisted of patients Situation, Background, Assessment and   Recommendations(SBAR). Information from the following report(s) Procedure Summary was reviewed with the receiving nurse. Lines:   Peripheral IV 07/19/19 Left;Posterior Wrist (Active)        Opportunity for questions and clarification was provided.       Patient transported with:   Hospital transport

## 2019-07-19 NOTE — PROCEDURES
Pre-Procedure Diagnosis  1. Documented non-reversible symptomatic bradycardia due to sinus node dysfunction. Procedure Performed  1. Insertion of a dual chamber PPM    Anesthesia: MAC     Estimated Blood Loss: Less than 10 mL     Specimens: * No specimens in log *     Patient Information and Indications: The procedure, indications, risks, benefits, and alternatives were discussed with the patient and family members, who desired to proceed after questions were answered and informed consent was documented. Procedure: After informed consent was obtained, the patient was brought to the Electrophysiology Laboratory in a fasting state and was prepped and draped in sterile fashion. Prophylactic antibiotic was administered 10 minutes prior to skin incision: refer to anesthesia procedural documentation for full details. Conscious sedation was administered by anesthesia with continuous oxygen saturation measurement and blood pressure measurement. Local anesthetic (sensorcaine) was delivered to the left pectoral region and an incision was made parallel to the deltopectoral groove. A subcutaneous pocket was created using blunt dissection and electrocautery, and adequate hemostasis was established. Access x 2 was obtained under ultrasound guidance using a modified Seldinger technique with placement of 2 wires down into the RA and advanced below the diaphragm. Next, placement of a 6Fr peel-away sheath over the first guidewire was performed. A permanent RV pm lead was then advanced under fluoroscopic guidance via the 6Fr sheath into the RV in a stable position with satisfactory sensing and pacing characteristics. The peel away sheath was removed. A 6Fr Safe-sheath was then placed in the second guidewire. A permanent pacing lead was advanced under fluoroscopic guidance and positioned in the right atrium. Stable RA appendage position with satisfactory sensing and pacing characteristics was obtained. The sheath was peeled. The leads were sutured to the underlying pectoralis fascia using 2 non-absorbable sutures around each lead's collar. The lead slack and position was assessed under fluoroscopy while securing the lead collars to ensure proper length. Final lead testing via the pacing system analyzer (PSA) demonstrated stable sensing, impedance and pacing thresholds. The lead pins were then cleaned with antibiotic soaked gauze, dried gently, and attached to a new pacemaker generator. Pins were directly observed to pass the tip electrode, and the ring hex wrench screws were secured, and leads tug tested. The device and leads were gently positioned within the pocket after the pocket was irrigated copiously with a saline antimicrobial solution. The wound was closed with multiple layers of absorbable suture followed by skin closure with staples. Fluoroscopic images were interpreted by me, in multiple projections. Final device position was confirmed by stored fluoroscopic image from device pocket to lead tips in AP projection. The patient tolerated the procedure well and left the lab in good condition. Complications: None     Lead Data:    Pulse Generator Model #  Serial # Location Implant/Explant   [id number] Biotronik [id number] Left Pectoral Implant     Lead Model Number  Serial Number Lead position Implant/Explant   RA [id number] Biotronik [id number] RA Appendage Implant   RV [id number] Biotronik [id number] RV Sterling Implant     Lead Sensitivity and Threshold  Lead R or P sensitivity (mv) Threshold (V) Threshold PW (msec) Impedance (ohms) Final output Voltage (V) Final PW (msec)   RA 0.6 (AF) N/A 0.4 468 3.0 0.4   RV 5.8 0.6 0.4 643 3.0 0.4     Bradycardia Settings  Marcelo Mode LRL URL Pace AVD (ms) Sense AVD (ms) Rate Response Mode Switching Mode SW Rate   DDDCLS 60 120 300 300 Y Y 160     No Contrast     Fluoro Time: 4 min    Impression: 1. Successful implantation and testing of a Biotronik dual chamber Pacemaker.      Anastasia Christina Janice Nichols MD, MS  Clinical Cardiac Electrophysiology  7/19/2019  5:11 PM

## 2019-07-19 NOTE — PROGRESS NOTES
Patient transported to room 307 per hospital transport. Left chest site remains intact. Patient with no complaints.  Son at bedside

## 2019-07-19 NOTE — PROGRESS NOTES
Report received from [id number] [employer] [place] and [facility]. Procedural findings communicated. Intra procedural  medication administration reviewed. Progression of care discussed. Patient received into [address] [facility] 1 post procedure.      Incision site without bleeding or swelling     Dressing dry and intact     Patient instructed to limit movement to left upper extremity    Routine post procedural vital signs and site assessment initiated

## 2019-07-19 NOTE — PROGRESS NOTES
New patient arrived to the unit. Alert and oriented times 4. BP cycling on eagle. Bedrest until 0110. Dual admission skin assessment completed. Skin warm, dry, and intact. Left subclavian/chest wall PPM site with sling in place. Heels soft and intact. Healed scar on spine from old back surgery. Sacrum intact and blanchable. No breakdown noted. Admission complete, patient oriented to room and floor, no questions voiced at this time. 1928: Verbal bedside report received from Christina Low RN. Assumed care of patient.

## 2019-07-19 NOTE — PROGRESS NOTES
Patient received to [address] [place] [address] room # 10  Ambulatory from [facility]. Patient scheduled for PPM today with Dr Khloe Amaral. Procedure reviewed & questions answered, voiced good understanding consent obtained & placed on chart. All medications and medical history reviewed. Will prep patient per orders. Patient & family updated on plan of care. The patient has a fraility score of 3-MANAGING WELL, based on independent of ADLs/ambulation. Increased symptoms with exertion.

## 2019-07-19 NOTE — ANESTHESIA PREPROCEDURE EVALUATION
Relevant Problems   No relevant active problems       Anesthetic History   No history of anesthetic complications            Review of Systems / Medical History  Patient summary reviewed and pertinent labs reviewed    Pulmonary              Pertinent negatives: No COPDSleep apnea: denies.      Neuro/Psych   Within defined limits           Cardiovascular    Hypertension: well controlled        Dysrhythmias (francisca) : atrial fibrillation  PAD and hyperlipidemia         GI/Hepatic/Renal  Within defined limits              Endo/Other  Within defined limits           Other Findings   Comments: ED    0830 prune juice           Physical Exam    Airway  Mallampati: I  TM Distance: 4 - 6 cm  Neck ROM: normal range of motion   Mouth opening: Normal     Cardiovascular    Rhythm: irregular  Rate: normal         Dental    Dentition: Caps/crowns  Comments: Cap right front   Pulmonary  Breath sounds clear to auscultation               Abdominal  Abdominal exam normal       Other Findings            Anesthetic Plan    ASA: 3  Anesthesia type: total IV anesthesia          Induction: Intravenous  Anesthetic plan and risks discussed with: Patient and Family

## 2019-07-20 VITALS
SYSTOLIC BLOOD PRESSURE: 116 MMHG | OXYGEN SATURATION: 96 % | WEIGHT: 174 LBS | DIASTOLIC BLOOD PRESSURE: 50 MMHG | TEMPERATURE: 98.1 F | HEART RATE: 66 BPM | HEIGHT: 72 IN | BODY MASS INDEX: 23.57 KG/M2 | RESPIRATION RATE: 18 BRPM

## 2019-07-20 LAB
ATRIAL RATE: 86 BPM
CALCULATED R AXIS, ECG10: -29 DEGREES
CALCULATED T AXIS, ECG11: 6 DEGREES
DIAGNOSIS, 93000: NORMAL
Q-T INTERVAL, ECG07: 418 MS
QRS DURATION, ECG06: 96 MS
QTC CALCULATION (BEZET), ECG08: 424 MS
VENTRICULAR RATE, ECG03: 62 BPM

## 2019-07-20 PROCEDURE — 74011250637 HC RX REV CODE- 250/637: Performed by: INTERNAL MEDICINE

## 2019-07-20 PROCEDURE — 99218 HC RM OBSERVATION: CPT

## 2019-07-20 PROCEDURE — 74011250636 HC RX REV CODE- 250/636: Performed by: INTERNAL MEDICINE

## 2019-07-20 RX ADMIN — Medication 2 G: at 09:39

## 2019-07-20 RX ADMIN — ACETAMINOPHEN 650 MG: 325 TABLET, FILM COATED ORAL at 08:34

## 2019-07-20 RX ADMIN — Medication 10 ML: at 05:39

## 2019-07-20 RX ADMIN — Medication 2 G: at 01:47

## 2019-07-20 NOTE — PROGRESS NOTES
Verbal and bedside report received from [place] [id number]. Left subclavian site without bleeding or hematoma.

## 2019-07-20 NOTE — PROGRESS NOTES
Discharge instructions and medications reviewed with patient. Patient verbalizes understandnig. All questions answered. IV and monitor removed.

## 2019-07-20 NOTE — PROGRESS NOTES
Verbal bedside report given to angelica Salcedo RN. Patient's situation, background, assessment and recommendations provided. Opportunity for questions provided. Oncoming RN assumed care of patient. Left subclavian site visualized.

## 2019-07-20 NOTE — DISCHARGE INSTRUCTIONS
Pacemaker Placement: What to Expect at [facility] placement is surgery to put a pacemaker in your chest. This surgery may be done if you have bradycardia (a slow heart rate). A pacemaker is a small, battery-powered device. It sends electrical signals to the heart. These signals work to keep the heartbeat steady. Thin wires, called leads, carry the signals from the pacemaker to the heart. A pacemaker can prevent or reduce dizziness, fainting, and shortness of breath caused by a slow or unsteady heartbeat. Your chest may be sore where the doctor made the cut (incision) and put in the pacemaker. You also may have a bruise and mild swelling. These symptoms usually get better in 1 to 2 weeks. You may feel a hard ridge along the incision. This usually gets softer in the months after surgery. You may be able to see or feel the outline of the pacemaker under your skin. You will probably be able to go back to work or your usual routine 1 to 2 weeks after surgery. Pacemaker batteries usually last 5 to 15 years. Your doctor will talk to you about how often you will need to have your pacemaker checked. You'll need to take steps to safely use electric devices. Some of these devices can stop your pacemaker from working right for a short time. Check with your doctor about what to avoid and what to keep a short distance away from your pacemaker. For example, you will need to stay away from things with strong magnetic and electrical fields. An example is an MRI machine (unless your pacemaker is safe for an MRI). You can use a cell phone and other wireless devices but keep them at least 6 inches away from your pacemaker. Many household and office electronics do not affect a pacemaker. These include kitchen appliances and computers. This care sheet gives you a general idea about how long it will take for you to recover. But each person recovers at a different pace.  Follow the steps below to get better as quickly as possible. How can you care for yourself at home? Activity    · Rest when you feel tired.     · Be active. Walking is a good choice.     · For 4 to 6 weeks:  ? Avoid activities that strain your chest or upper arm muscles. This includes pushing a  or vacuum, or mopping floors. It also includes swimming, or swinging a golf club or tennis racquet. ? Do not raise your arm (the one on the side of your body where the pacemaker is located) above your shoulder. ? Allow your body to heal. Don't move quickly or lift anything heavy until you are feeling better.     · Many people are able to return to work within 1 to 2 weeks after surgery.     · Ask your doctor when it is okay for you to have sex. Diet    · You can eat your normal diet. If your stomach is upset, try bland, low-fat foods like plain rice, broiled chicken, toast, and yogurt. Medicines    · Your doctor will tell you if and when you can restart your medicines. He or she will also give you instructions about taking any new medicines.     · If you take aspirin or some other blood thinner, be sure to talk to your doctor. He or she will tell you if and when to start taking this medicine again. Make sure that you understand exactly what your doctor wants you to do.     · Be safe with medicines. Read and follow all instructions on the label. ? If the doctor gave you a prescription medicine for pain, take it as prescribed. ? If you are not taking a prescription pain medicine, ask your doctor if you can take an over-the-counter medicine. ? Do not take aspirin, ibuprofen (Advil, Motrin), naproxen (Aleve), or other nonsteroidal anti-inflammatory drugs (NSAIDs) unless your doctor says it is okay.     · If your doctor prescribed antibiotics, take them as directed. Do not stop taking them just because you feel better. You need to take the full course of antibiotics.    Incision care    · If you have strips of tape on the incision, leave the tape on for a week or until it falls off.     · Keep the incision dry while it heals. Your doctor may recommend sponge baths for about 7 days, but do not get the incision wet. Your doctor will let you know when you may take showers. After a shower, pat the incision dry.     · Don't use hydrogen peroxide or alcohol on the incision, which can slow healing. You may cover the area with a gauze bandage if it oozes fluid or rubs against clothing. Change the bandage every day.     · Do not take a bath or get into a hot tub for the first 2 weeks, or until your doctor tells you it is okay. Other instructions    · Keep a medical ID card with you at all times that says you have a pacemaker. The card should include the  and model information.     · Wear medical alert jewelry stating that you have a pacemaker. You can buy this at most [employer].     · Check your pulse as directed by your doctor.     · Have your pacemaker checked as often as your doctor recommends. In some cases, this may be done over the phone or the Internet. Your doctor will give you instructions about how to do this. Follow-up care is a key part of your treatment and safety. Be sure to make and go to all appointments, and call your doctor if you are having problems. It's also a good idea to know your test results and keep a list of the medicines you take. When should you call for help? Call 911 anytime you think you may need emergency care.  For example, call if:    · You passed out (lost consciousness).     · You have trouble breathing.    Call your doctor now or seek immediate medical care if:    · You are dizzy or light-headed, or you feel like you may faint.     · You have pain that does not get better after you take pain medicine.     · You hear an alarm or feel a vibration from your pacemaker.     · You have loose stitches, or your incision comes open.     · Bright red blood has soaked through the bandage over your incision.     · You have signs of infection, such as:  ? Increased pain, swelling, warmth, or redness. ? Red streaks leading from the incision. ? Pus draining from the incision. ? A fever.    Watch closely for changes in your health, and be sure to contact your doctor if:    · You have any problems with your pacemaker. Where can you learn more? Go to http://aba-jacqueline.info/. Enter G550 in the search box to learn more about \"Pacemaker Placement: What to Expect at Home. \"  Current as of: July 22, 2018  Content Version: 12.1  © 8651-3176 [employer]. Care instructions adapted under license by [employer] (which disclaims liability or warranty for this information). If you have questions about a medical condition or this instruction, always ask your healthcare professional. [place] [address] any warranty or liability for your use of this information. DISCHARGE SUMMARY from Nurse    PATIENT INSTRUCTIONS:    After general anesthesia or intravenous sedation, for 24 hours or while taking prescription Narcotics:  · Limit your activities  · Do not drive and operate hazardous machinery  · Do not make important personal or business decisions  · Do  not drink alcoholic beverages  · If you have not urinated within 8 hours after discharge, please contact your surgeon on call. Report the following to your surgeon:  · Excessive pain, swelling, redness or odor of or around the surgical area  · Temperature over 100.5  · Nausea and vomiting lasting longer than 4 hours or if unable to take medications  · Any signs of decreased circulation or nerve impairment to extremity: change in color, persistent  numbness, tingling, coldness or increase pain  · Any questions    What to do at Home:  Recommended activity: No heavy lifting, pushing, pulling with the implant side for 2 months.     If you experience any of the following symptoms chest pain, shortness of breath, palpitations, swelling, please follow up with [facility] Cardiology. *  Please give a list of your current medications to your Primary Care Provider. *  Please update this list whenever your medications are discontinued, doses are      changed, or new medications (including over-the-counter products) are added. *  Please carry medication information at all times in case of emergency situations. These are general instructions for a healthy lifestyle:    No smoking/ No tobacco products/ Avoid exposure to second hand smoke  Surgeon General's Warning:  Quitting smoking now greatly reduces serious risk to your health. Obesity, smoking, and sedentary lifestyle greatly increases your risk for illness    A healthy diet, regular physical exercise & weight monitoring are important for maintaining a healthy lifestyle    You may be retaining fluid if you have a history of heart failure or if you experience any of the following symptoms:  Weight gain of 3 pounds or more overnight or 5 pounds in a week, increased swelling in our hands or feet or shortness of breath while lying flat in bed. Please call your doctor as soon as you notice any of these symptoms; do not wait until your next office visit. The discharge information has been reviewed with the patient. The patient verbalized understanding. Discharge medications reviewed with the patient and appropriate educational materials and side effects teaching were provided.   ___________________________________________________________________________________________________________________________________

## 2019-07-20 NOTE — PROGRESS NOTES
[facility] CARDIOLOGY PROGRESS NOTE           7/20/2019 9:05 AM    Admit Date: 7/19/2019      Subjective:   S/P PM.  Now in AF that is rate controlled. ROS:  Cardiovascular:  As noted above    Objective:      Vitals:    07/19/19 2045 07/19/19 2121 07/20/19 0058 07/20/19 0501   BP: 145/78  117/56 99/57   Pulse: 65  65 66   Resp: 18  18 18   Temp: 97.3 °F (36.3 °C)  98 °F (36.7 °C) 98.4 °F (36.9 °C)   SpO2: 96% 97% 95% 93%   Weight:    78.9 kg (174 lb)   Height:           Physical Exam:  General-No Acute Distress  Neck- supple, no JVD  CV- irregular rate and rhythm no MRG  Lung- clear bilaterally  Abd- soft, nontender, nondistended  Ext- no edema bilaterally. Skin- warm and dry    Data Review:   Recent Labs     07/19/19  1402      K 4.9   MG 2.2   BUN 32*   CREA 1.98*   GLU 79   WBC 6.7   HGB 13.1*   HCT 39.2*      INR 1.0       Assessment/Plan:     Active Problems:    Paroxysmal atrial fibrillation (HCC) (5/13/2016)    In AF that is rate controlled. Restart therapies to include Multaq and Eliquis.   Defer therapy changes to Dr Janie Jennings and Lynne Ortega as outpatient       Symptomatic bradycardia (7/19/2019)    S/P PM with normal function    DC home          Heber Xiao MD  7/20/2019 9:05 AM

## 2019-07-20 NOTE — DISCHARGE SUMMARY
[facility] Cardiology Discharge Summary     Patient ID:  Zarina Gerardo  [id number]  80 y.o.  7/20/1933    Admit date: 7/19/2019    Discharge date and time:  7-    Admitting Physician: Chato Harrington MD     Discharge Physician: Alexey Feliz NP/     Admission Diagnoses: Bradycardia [R00.1]  S/P atrioventricular sonia ablation [Z98.890]  Symptomatic bradycardia [R00.1]    Discharge Diagnoses:    Diagnosis    S/P atrioventricular sonia ablation        Symptomatic bradycardia    Bradycardia    Essential hypertension    Hyperlipidemia    Paroxysmal atrial fibrillation (HCC)    COPD (chronic obstructive pulmonary disease) (HCC)    Shortness of breath dyspnea    Malignant neoplasm of prostate ([address]    Carotid stenosis, asymptomatic, bilateral       Cardiology Procedures this admission:  Pacemaker Implantation, CXR  Consults: none    Hospital Course: Pt was admitted with on reversible symptomatic bradycardia. Pt was scheduled for an AM Admission PM implantation at [facility] on 7-19-19. Pt came in to [facility] and was taken to the cath lab by Dr. Melissa Gaines. Pt received a Biotornik pacemaker without complication. Follow up CXR showed no pneumothorax. Pt tolerated the procedure well and was taken to the telemetry floor for recovery. The following morning Pt was up feeling well without any complaints of CP, SOB or palpitations. Pt's left subclavian PM site was clean, dry and intact without hematoma. Pt's labs were WNL. Pt was seen and examined by Dr. Bry Vaz and determined stable and ready for discharge. Pt was instructed to follow PM discharge instructions given by nursing staff. F/U with Dr Melissa Gaines 8-2-19 at 11 AM for device check. Pt was noted to be in afib this am. Eliquis and multaq will be Rx. He will follow in office in two weeks with device check and further afib management.    Gabe Sham has been stopped to lessen bleeding risk. ]      DISPOSITION: The patient is being discharged home in stable condition on a low saturated fat, low cholesterol and low salt diet. Pt is instructed to advance activities as tolerated limited to fatigue or shortness of breath. Pt is instructed not to lift anything over 5-10 lbs with affected arm. No pushing or pulling or lifting arm above shoulder. See complete discharge instructions. Pt is instructed to watch PM site for bleeding/oozing, if seen Pt is instructed to apply firm pressure with clean cloth and call office at [phone]. Pt is instructed to watch for signs of infection which include increasing area of redness around site, fever/hot to touch or purulent drainage. Pt is instructed to call office or return to ER for immediate evaluation of any shortness of breath, chest pain or palpitations. Discharge Exam:   Visit Vitals  /77 (BP 1 Location: Right arm, BP Patient Position: At rest)   Pulse 69   Temp 98 °F (36.7 °C)   Resp 18   Ht 6' (1.829 m)   Wt 81.7 kg (180 lb 1.6 oz)   SpO2 97%   BMI 24.43 kg/m²      Pt has been seen by Marquez: see his progress note for exam details.     Recent Results (from the past 24 hour(s))   CBC W/O DIFF    Collection Time: 07/19/19  2:02 PM   Result Value Ref Range    WBC 6.7 4.3 - 11.1 K/uL    RBC 4.21 (L) 4.23 - 5.6 M/uL    HGB 13.1 (L) 13.6 - 17.2 g/dL    HCT 39.2 (L) 41.1 - 50.3 %    MCV 93.1 79.6 - 97.8 FL    MCH 31.1 26.1 - 32.9 PG    MCHC 33.4 31.4 - 35.0 g/dL    RDW 14.1 11.9 - 14.6 %    PLATELET 208 935 - 478 K/uL    MPV 9.6 9.4 - 12.3 FL    ABSOLUTE NRBC 0.00 0.0 - 0.2 K/uL   METABOLIC PANEL, BASIC    Collection Time: 07/19/19  2:02 PM   Result Value Ref Range    Sodium 143 136 - 145 mmol/L    Potassium 4.9 3.5 - 5.1 mmol/L    Chloride 111 (H) 98 - 107 mmol/L    CO2 25 21 - 32 mmol/L    Anion gap 7 7 - 16 mmol/L    Glucose 79 65 - 100 mg/dL    BUN 32 (H) 8 - 23 MG/DL    Creatinine 1.98 (H) 0.8 - 1.5 MG/DL    GFR est AA 42 (L) >60 ml/min/1.73m2    GFR est non-AA 34 (L) >60 ml/min/1.73m2    Calcium 8.9 8.3 - 10.4 MG/DL   PROTHROMBIN TIME + INR    Collection Time: 07/19/19  2:02 PM   Result Value Ref Range    Prothrombin time 13.1 11.7 - 14.5 sec    INR 1.0     MAGNESIUM    Collection Time: 07/19/19  2:02 PM   Result Value Ref Range    Magnesium 2.2 1.8 - 2.4 mg/dL   EKG, 12 LEAD, INITIAL    Collection Time: 07/19/19  2:05 PM   Result Value Ref Range    Ventricular Rate 75 BPM    Atrial Rate 234 BPM    QRS Duration 90 ms    Q-T Interval 400 ms    QTC Calculation (Bezet) 446 ms    Calculated R Axis 9 degrees    Calculated T Axis 33 degrees    Diagnosis       Atrial fibrillation  Abnormal ECG  No previous ECGs available  Confirmed by FABIAN HILL (), Andrew Pedro ([id number]) on 7/19/2019 2:54:52 PM           Patient Instructions:       Current Discharge Medication List      CONTINUE these medications which have NOT CHANGED    Details   benazepril (LOTENSIN) 10 mg tablet Take 10 mg by mouth daily. dronedarone (MULTAQ) tab tablet TAKE 1 TABLET TWICE A DAY WITH MEALS  Qty: 180 Tab, Refills: 3      bimatoprost (LUMIGAN) 0.01 % ophthalmic drops Administer 1 Drop to both eyes every evening. albuterol (PROAIR HFA) 90 mcg/actuation inhaler Take 2 Puffs by inhalation every six (6) hours as needed. fluticasone-salmeterol (ADVAIR DISKUS) 250-50 mcg/dose diskus inhaler Take 1 puff by inhalation every twelve (12) hours. tiotropium (SPIRIVA WITH HANDIHALER) 18 mcg inhalation capsule Take 1 capsule by inhalation daily. apixaban (ELIQUIS) 2.5 mg tablet Take 2.5 mg by mouth two (2) times a day. fluocinonide (LIDEX) 0.05 % topical gel Apply  to affected area two (2) times a day.          STOP taking these medications       naproxen sodium (ALEVE) 220 mg cap Comments:   Reason for Stopping:               Aurelia Smith MD

## 2019-07-20 NOTE — PROGRESS NOTES
Problem: Falls - Risk of  Goal: *Absence of Falls  Description  Document Sophie Sage Fall Risk and appropriate interventions in the flowsheet.   Outcome: Progressing Towards Goal  Note:   Fall Risk Interventions:            Medication Interventions: Evaluate medications/consider consulting pharmacy, Patient to call before getting OOB

## 2019-07-24 ENCOUNTER — HOSPITAL ENCOUNTER (EMERGENCY)
Age: 84
Discharge: HOME | End: 2019-07-24
Attending: EMERGENCY MEDICINE
Payer: MEDICARE

## 2019-07-24 ENCOUNTER — APPOINTMENT (OUTPATIENT)
Dept: GENERAL RADIOLOGY | Age: 84
End: 2019-07-24
Attending: EMERGENCY MEDICINE
Payer: MEDICARE

## 2019-07-24 ENCOUNTER — APPOINTMENT (OUTPATIENT)
Dept: ULTRASOUND IMAGING | Age: 84
End: 2019-07-24
Attending: EMERGENCY MEDICINE
Payer: MEDICARE

## 2019-07-24 VITALS
TEMPERATURE: 97.8 F | OXYGEN SATURATION: 93 % | SYSTOLIC BLOOD PRESSURE: 193 MMHG | RESPIRATION RATE: 21 BRPM | BODY MASS INDEX: 23.57 KG/M2 | HEIGHT: 72 IN | HEART RATE: 75 BPM | WEIGHT: 174 LBS | DIASTOLIC BLOOD PRESSURE: 85 MMHG

## 2019-07-24 DIAGNOSIS — T82.837A HEMATOMA OF PACEMAKER POCKET, INITIAL ENCOUNTER: Primary | ICD-10-CM

## 2019-07-24 LAB
ALBUMIN SERPL-MCNC: 3.3 G/DL (ref 3.2–4.6)
ALBUMIN/GLOB SERPL: 0.8 {RATIO} (ref 1.2–3.5)
ALP SERPL-CCNC: 68 U/L (ref 50–136)
ALT SERPL-CCNC: 15 U/L (ref 12–65)
ANION GAP SERPL CALC-SCNC: 8 MMOL/L (ref 7–16)
APTT PPP: 30.5 SEC (ref 24.7–39.8)
AST SERPL-CCNC: 45 U/L (ref 15–37)
BASOPHILS # BLD: 0 K/UL (ref 0–0.2)
BASOPHILS NFR BLD: 1 % (ref 0–2)
BILIRUB SERPL-MCNC: 0.5 MG/DL (ref 0.2–1.1)
BUN SERPL-MCNC: 28 MG/DL (ref 8–23)
CALCIUM SERPL-MCNC: 9.2 MG/DL (ref 8.3–10.4)
CHLORIDE SERPL-SCNC: 105 MMOL/L (ref 98–107)
CO2 SERPL-SCNC: 24 MMOL/L (ref 21–32)
CREAT SERPL-MCNC: 2 MG/DL (ref 0.8–1.5)
DIFFERENTIAL METHOD BLD: ABNORMAL
EOSINOPHIL # BLD: 0.5 K/UL (ref 0–0.8)
EOSINOPHIL NFR BLD: 7 % (ref 0.5–7.8)
ERYTHROCYTE [DISTWIDTH] IN BLOOD BY AUTOMATED COUNT: 14.3 % (ref 11.9–14.6)
GLOBULIN SER CALC-MCNC: 4.3 G/DL (ref 2.3–3.5)
GLUCOSE SERPL-MCNC: 97 MG/DL (ref 65–100)
HCT VFR BLD AUTO: 39.4 % (ref 41.1–50.3)
HGB BLD-MCNC: 13 G/DL (ref 13.6–17.2)
IMM GRANULOCYTES # BLD AUTO: 0 K/UL (ref 0–0.5)
IMM GRANULOCYTES NFR BLD AUTO: 0 % (ref 0–5)
INR PPP: 1
LYMPHOCYTES # BLD: 1.7 K/UL (ref 0.5–4.6)
LYMPHOCYTES NFR BLD: 23 % (ref 13–44)
MCH RBC QN AUTO: 30.7 PG (ref 26.1–32.9)
MCHC RBC AUTO-ENTMCNC: 33 G/DL (ref 31.4–35)
MCV RBC AUTO: 92.9 FL (ref 79.6–97.8)
MONOCYTES # BLD: 0.6 K/UL (ref 0.1–1.3)
MONOCYTES NFR BLD: 8 % (ref 4–12)
NEUTS SEG # BLD: 4.6 K/UL (ref 1.7–8.2)
NEUTS SEG NFR BLD: 62 % (ref 43–78)
NRBC # BLD: 0 K/UL (ref 0–0.2)
PLATELET # BLD AUTO: 208 K/UL (ref 150–450)
PMV BLD AUTO: 9.7 FL (ref 9.4–12.3)
POTASSIUM SERPL-SCNC: 6.4 MMOL/L (ref 3.5–5.1)
PROT SERPL-MCNC: 7.6 G/DL (ref 6.3–8.2)
PROTHROMBIN TIME: 13.5 SEC (ref 11.7–14.5)
RBC # BLD AUTO: 4.24 M/UL (ref 4.23–5.6)
SODIUM SERPL-SCNC: 137 MMOL/L (ref 136–145)
WBC # BLD AUTO: 7.5 K/UL (ref 4.3–11.1)

## 2019-07-24 PROCEDURE — 74011250636 HC RX REV CODE- 250/636: Performed by: EMERGENCY MEDICINE

## 2019-07-24 PROCEDURE — 80053 COMPREHEN METABOLIC PANEL: CPT

## 2019-07-24 PROCEDURE — 99284 EMERGENCY DEPT VISIT MOD MDM: CPT | Performed by: EMERGENCY MEDICINE

## 2019-07-24 PROCEDURE — 85025 COMPLETE CBC W/AUTO DIFF WBC: CPT

## 2019-07-24 PROCEDURE — 71045 X-RAY EXAM CHEST 1 VIEW: CPT

## 2019-07-24 PROCEDURE — 85610 PROTHROMBIN TIME: CPT

## 2019-07-24 PROCEDURE — 96360 HYDRATION IV INFUSION INIT: CPT | Performed by: EMERGENCY MEDICINE

## 2019-07-24 PROCEDURE — 93005 ELECTROCARDIOGRAM TRACING: CPT | Performed by: EMERGENCY MEDICINE

## 2019-07-24 PROCEDURE — 85730 THROMBOPLASTIN TIME PARTIAL: CPT

## 2019-07-24 PROCEDURE — 74011250637 HC RX REV CODE- 250/637: Performed by: EMERGENCY MEDICINE

## 2019-07-24 PROCEDURE — 76882 US LMTD JT/FCL EVL NVASC XTR: CPT

## 2019-07-24 RX ORDER — CEPHALEXIN 500 MG/1
500 CAPSULE ORAL 3 TIMES DAILY
Qty: 30 CAP | Refills: 0 | Status: SHIPPED | OUTPATIENT
Start: 2019-07-24 | End: 2019-08-03

## 2019-07-24 RX ORDER — SODIUM POLYSTYRENE SULFONATE 4.1 MEQ/G
15 POWDER, FOR SUSPENSION ORAL; RECTAL
Status: DISCONTINUED | OUTPATIENT
Start: 2019-07-24 | End: 2019-07-25 | Stop reason: HOSPADM

## 2019-07-24 RX ORDER — HYDROCODONE BITARTRATE AND ACETAMINOPHEN 5; 325 MG/1; MG/1
2 TABLET ORAL ONCE
Status: COMPLETED | OUTPATIENT
Start: 2019-07-24 | End: 2019-07-24

## 2019-07-24 RX ORDER — HYDROCODONE BITARTRATE AND ACETAMINOPHEN 5; 325 MG/1; MG/1
1 TABLET ORAL
Qty: 18 TAB | Refills: 0 | Status: SHIPPED | OUTPATIENT
Start: 2019-07-24 | End: 2019-07-27

## 2019-07-24 RX ADMIN — HYDROCODONE BITARTRATE AND ACETAMINOPHEN 2 TABLET: 5; 325 TABLET ORAL at 19:10

## 2019-07-24 RX ADMIN — SODIUM CHLORIDE 1000 ML: 900 INJECTION, SOLUTION INTRAVENOUS at 20:42

## 2019-07-24 NOTE — ED PROVIDER NOTES
Pleasant 80-year-old male presenting for increasing pain and swelling over the site of a recently placed pacemaker. He had placed on Friday as been doing well with it until today. He very rapidly had increased pain, swelling and some discoloration of the area. He has taken nothing for it. He has no other symptoms. never happen before. The history is provided by the patient. Pacemaker Problem    This is a new problem. The current episode started less than 1 hour ago. The problem has not changed since onset. The problem occurs constantly.         Past Medical History:   Diagnosis Date    Atrial fibrillation ([facility]     Cancer [facility])     prostate    Chronic obstructive pulmonary disease (HCC)     COPD (chronic obstructive pulmonary disease) ([facility] 2016    Erectile dysfunction     Essential hypertension 2016    Hypercholesterolemia     Hyperlipidemia 2016    Hypertension     Malignant neoplasm of prostate ([facility] 2016    Paroxysmal atrial fibrillation ([facility] 2016    Peripheral vascular disease ([facility]     with carotid stenosis    Shortness of breath dyspnea 2016    Unspecified sleep apnea     np cpap after wt loss       Past Surgical History:   Procedure Laterality Date    HX COLONOSCOPY      HX ORTHOPAEDIC  1996    back surgery    HX VASECTOMY           Family History:   Problem Relation Age of Onset    Coronary Artery Disease Father     Hypertension Father     Heart Attack Father 62    Asthma Mother     Stroke Mother     Dementia Mother     Other Mother 80         of gangrene       Social History     Socioeconomic History    Marital status:      Spouse name: Not on file    Number of children: Not on file    Years of education: Not on file    Highest education level: Not on file   Occupational History    Not on file   Social Needs    Financial resource strain: Not on file    Food insecurity:     Worry: Not on file     Inability: Not on file   Tino Mena Transportation needs:     Medical: Not on file     Non-medical: Not on file   Tobacco Use    Smoking status: Former Smoker     Packs/day: 2.00     Years: 45.00     Pack years: 90.00     Last attempt to quit: 1985     Years since quittin.5    Smokeless tobacco: Never Used   Substance and Sexual Activity    Alcohol use: No    Drug use: Not on file    Sexual activity: Not on file   Lifestyle    Physical activity:     Days per week: Not on file     Minutes per session: Not on file    Stress: Not on file   Relationships    Social connections:     Talks on phone: Not on file     Gets together: Not on file     Attends [employer] service: Not on file     Active member of club or organization: Not on file     Attends meetings of clubs or organizations: Not on file     Relationship status: Not on file    Intimate partner violence:     Fear of current or ex partner: Not on file     Emotionally abused: Not on file     Physically abused: Not on file     Forced sexual activity: Not on file   Other Topics Concern    Not on file   Social History Narrative    Not on file         ALLERGIES: Clarithromycin; Neuromuscular blockers, steroidal; and Sulfa (sulfonamide antibiotics)    Review of Systems   Skin: Positive for wound. swelling   All other systems reviewed and are negative. Vitals:    19 1837   BP: (!) 206/85   Pulse: 67   Resp: 16   Temp: 97.8 °F (36.6 °C)   SpO2: 96%   Weight: 78.9 kg (174 lb)   Height: 6' (1.829 m)            Physical Exam   Constitutional: He is oriented to person, place, and time. He appears well-developed and well-nourished. HENT:   Head: Normocephalic and atraumatic. Eyes: Pupils are equal, round, and reactive to light. Conjunctivae and EOM are normal.   Neck: Normal range of motion. Neck supple. Cardiovascular: Normal rate, regular rhythm, normal heart sounds and intact distal pulses.    6 x 6 firm yet fluctuant area directly underneath the post surgical scar for a pacemaker placement with some healing surrounding ecchymosis, no drainage, tender to palpation but no erythema   Pulmonary/Chest: Effort normal and breath sounds normal.   Abdominal: Soft. Bowel sounds are normal.   Musculoskeletal: Normal range of motion. He exhibits no deformity. Neurological: He is alert and oriented to person, place, and time. No cranial nerve deficit. Skin: Skin is warm and dry. Psychiatric: He has a normal mood and affect. His behavior is normal.   Nursing note and vitals reviewed. MDM  Number of Diagnoses or Management Options  Hematoma of pacemaker pocket, initial encounter:   Diagnosis management comments: 45-year-old male presenting for pain and swelling directly underneath where he recently had a pacemaker placed. I think that he is having some postsurgical bleeding into the pocket. We will get basic labs, coags, and an ultrasound of the area.        Amount and/or Complexity of Data Reviewed  Clinical lab tests: ordered and reviewed (Results for orders placed or performed during the hospital encounter of 07/24/19  -CBC WITH AUTOMATED DIFF       Result                      Value             Ref Range           WBC                         7.5               4.3 - 11.1 K*       RBC                         4.24              4.23 - 5.6 M*       HGB                         13.0 (L)          13.6 - 17.2 *       HCT                         39.4 (L)          41.1 - 50.3 %       MCV                         92.9              79.6 - 97.8 *       MCH                         30.7              26.1 - 32.9 *       MCHC                        33.0              31.4 - 35.0 *       RDW                         14.3              11.9 - 14.6 %       PLATELET                    208               150 - 450 K/*       MPV                         9.7               9.4 - 12.3 FL       ABSOLUTE NRBC               0.00              0.0 - 0.2 K/*       DF                          AUTOMATED NEUTROPHILS                 62                43 - 78 %           LYMPHOCYTES                 23                13 - 44 %           MONOCYTES                   8                 4.0 - 12.0 %        EOSINOPHILS                 7                 0.5 - 7.8 %         BASOPHILS                   1                 0.0 - 2.0 %         IMMATURE GRANULOCYTES       0                 0.0 - 5.0 %         ABS. NEUTROPHILS            4.6               1.7 - 8.2 K/*       ABS. LYMPHOCYTES            1.7               0.5 - 4.6 K/*       ABS. MONOCYTES              0.6               0.1 - 1.3 K/*       ABS. EOSINOPHILS            0.5               0.0 - 0.8 K/*       ABS. BASOPHILS              0.0               0.0 - 0.2 K/*       ABS. IMM.  GRANS.            0.0               0.0 - 0.5 K/*  -PROTHROMBIN TIME + INR       Result                      Value             Ref Range           Prothrombin time            13.5              11.7 - 14.5 *       INR                         1.0                              -PTT       Result                      Value             Ref Range           aPTT                        30.5              24.7 - 39.8 *  -EKG, 12 LEAD, INITIAL       Result                      Value             Ref Range           Ventricular Rate            69                BPM                 Atrial Rate                 340               BPM                 P-R Interval                278               ms                  QRS Duration                100               ms                  Q-T Interval                400               ms                  QTC Calculation (Bezet)     428               ms                  Calculated R Axis           -14               degrees             Calculated T Axis           33                degrees             Diagnosis                                                     Atrial-paced rhythm with prolonged AV conduction   Possible Anterior infarct , age undetermined   Abnormal ECG   When compared with ECG of 19-JUL-2019 20:06,   Electronic atrial pacemaker has replaced Atrial fibrillation     )  Tests in the radiology section of CPT®: ordered and reviewed (Xr Chest Port    Result Date: 7/24/2019  CHEST X-RAY, one view. HISTORY:  Chest swelling, recent hernia pacemaker placement. TECHNIQUE:  AP upright portable view. COMPARISON: 19 July 2019. FINDINGS:   No pneumothorax or hemothorax. Lungs are clear. There are skin staples over the left shoulder. Dual-lead left-sided cardiac pacemaker is present. IMPRESSION:  Negative for acute change. Xr Chest Port    Result Date: 7/19/2019  EXAM: Single view chest radiograph. INDICATION: 85 years Evaluate for pneumothorax COMPARISON: None. FINDINGS: Left chest wall cardiac pacer is noted. No pneumothorax or pleural effusion. No focal lung consolidation. Heart is normal in size. Diffuse osteopenia. IMPRESSION: 1. Left chest wall cardiac pacer placement. No evidence of pneumothorax.     )  Decide to obtain previous medical records or to obtain history from someone other than the patient: yes  Discuss the patient with other providers: yes (Discussed case with cardiologist on call)  Independent visualization of images, tracings, or specimens: yes (Ultrasound of the pocket shows what looks like a hematoma around the pacemaker.)    Risk of Complications, Morbidity, and/or Mortality  Presenting problems: moderate  Diagnostic procedures: moderate  Management options: moderate  General comments: Ultrasound consistent with hematoma  Recommendations are to stop blood thinner  Complete course of Keflex  Norco for pain  We are reapplying a sterile dressing over the area  Patient will be followed up in cardiology office before the completion of Keflex    Patient Progress  Patient progress: improved    ED Course as of Jul 24 2000 Wed Jul 24, 2019   1845 EKG was performed and shows an atrial paced rhythm of 75.     [JS]   1170 Contacting Mcgraw to discuss as he was the physician who placed the pacemaker and he happens to be on call this evening    [JS]   1906 Discussed the case with Dr. Eliecer Hayes who agrees with the above plan    [JS]      ED Course User Index  [JS] Liana Gaines MD       Procedures

## 2019-07-24 NOTE — DISCHARGE INSTRUCTIONS
This is a hematoma or pocket of blood around the pacemaker  Stop your blood thinner  Complete the full course of antibiotics  Follow-up with Dr. Becca Levine when you can be scheduled sometime in the next week  Return if you develop fevers, nausea, vomiting or poorly controlled pain

## 2019-07-24 NOTE — ED TRIAGE NOTES
Pt had pacemaker placed Friday. Pt states he was bending over today to pull up his pants and felt a sharp pain where pacemaker is. Pt states it is just a pacemaker. Pt has immense swelling to area.

## 2019-07-25 LAB
ATRIAL RATE: 340 BPM
CALCULATED R AXIS, ECG10: -14 DEGREES
CALCULATED T AXIS, ECG11: 33 DEGREES
DIAGNOSIS, 93000: NORMAL
P-R INTERVAL, ECG05: 278 MS
Q-T INTERVAL, ECG07: 400 MS
QRS DURATION, ECG06: 100 MS
QTC CALCULATION (BEZET), ECG08: 428 MS
VENTRICULAR RATE, ECG03: 69 BPM

## 2019-07-25 NOTE — ED NOTES
I have reviewed discharge instructions with the patient. The patient verbalized understanding. Patient left ED via Discharge Method: ambulatory to Home with son. Opportunity for questions and clarification provided. Patient given 2 scripts. To continue your aftercare when you leave the hospital, you may receive an automated call from our care team to check in on how you are doing. This is a free service and part of our promise to provide the best care and service to meet your aftercare needs.  If you have questions, or wish to unsubscribe from this service please call [phone]. Thank you for Choosing our [address] [place] [address] Emergency Department.

## 2019-08-02 ENCOUNTER — HOSPITAL ENCOUNTER (OUTPATIENT)
Dept: LAB | Age: 84
Discharge: HOME | End: 2019-08-02
Payer: MEDICARE

## 2019-08-02 DIAGNOSIS — I48.0 PAROXYSMAL ATRIAL FIBRILLATION (HCC): ICD-10-CM

## 2019-08-02 LAB
ANION GAP SERPL CALC-SCNC: 7 MMOL/L (ref 7–16)
BUN SERPL-MCNC: 34 MG/DL (ref 8–23)
CALCIUM SERPL-MCNC: 8.9 MG/DL (ref 8.3–10.4)
CHLORIDE SERPL-SCNC: 110 MMOL/L (ref 98–107)
CO2 SERPL-SCNC: 24 MMOL/L (ref 21–32)
CREAT SERPL-MCNC: 1.9 MG/DL (ref 0.8–1.5)
GLUCOSE SERPL-MCNC: 98 MG/DL (ref 65–100)
POTASSIUM SERPL-SCNC: 4.8 MMOL/L (ref 3.5–5.1)
SODIUM SERPL-SCNC: 141 MMOL/L (ref 136–145)

## 2019-08-02 PROCEDURE — 36415 COLL VENOUS BLD VENIPUNCTURE: CPT

## 2019-08-02 PROCEDURE — 80048 BASIC METABOLIC PNL TOTAL CA: CPT

## 2019-10-04 ENCOUNTER — TELEPHONE (OUTPATIENT)
Dept: CARDIAC REHAB | Age: 84
End: 2019-10-04

## 2019-10-04 NOTE — TELEPHONE ENCOUNTER
Talked to patient re: Pulmonary rehab referral.  He completed the program in 2015 and returned in 2017 for 12 additional visits. Does not qualify for [place]. Talked to him about joining [facility]. Will contact us if he decides to join,.

## 2019-10-30 ENCOUNTER — HOSPITAL ENCOUNTER (OUTPATIENT)
Dept: PHYSICAL THERAPY | Age: 84
Discharge: HOME | End: 2019-10-30
Payer: MEDICARE

## 2019-10-30 PROCEDURE — 97161 PT EVAL LOW COMPLEX 20 MIN: CPT

## 2019-10-30 PROCEDURE — 97110 THERAPEUTIC EXERCISES: CPT

## 2019-10-30 NOTE — THERAPY EVALUATION
Varinder Barillas  : 1933  [place]  Secondary: [address] [facility]  [address] , [address], [place] [address]  [phone]   [phone]        OUTPATIENT PHYSICAL THERAPY:Initial Assessment 10/30/2019    ICD-10: Treatment Diagnosis: low back pain (M54.5)  Sciatica, left side (M54.32)  Precautions/Allergies:   Clarithromycin; Neuromuscular blockers, steroidal; and Sulfa (sulfonamide antibiotics)   TREATMENT PLAN:  Effective Dates: 10/30/2019 TO 2019 (30 days). Frequency/Duration: 2 times a week for 30 Day(s)     MEDICAL/REFERRING DIAGNOSIS:  Low back pain   DATE OF ONSET: 7 months  REFERRING PHYSICIAN: Charles Mora MD MD Orders: Eval and Treat  Return MD Appointment:December     INITIAL ASSESSMENT:  Mr. Uziel Low presents with complaints of pain and tingling in his back and left leg mainly when first getting up in the mornings. 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 Signs and symptoms are consistent with left sided sciatica. Pt may benefit from PT to address the following problem list.    PROBLEM LIST (Impacting functional limitations):  1. Decreased ADL/Functional Activities  2. Increased Pain  3. Decreased Flexibility/Joint Mobility INTERVENTIONS PLANNED:  1. Heat  2. Home Exercise Program (HEP)  3. Manual Therapy  4. Therapeutic Exercise/Strengthening     GOALS: (Goals have been discussed and agreed upon with patient.)  Short-Term Functional Goals: Time Frame: 2 weeks  1. Independent in initial HEP  2. Minimal pain in AM  Discharge Goals: Time Frame: 4 weeks  1. Independent in advanced HEP  2. Pain free right lateral flexion  CLINICAL DECISION MAKING:   Outcome Measure:    Tool Used: Modified Oswestry Low Back Pain Questionnaire  Score:  Initial:   (Date: 10/30/19 ) Most Recent:  (Date: -- )   Interpretation of Score: Each section is scored on a 0-5 scale, 5 representing the greatest disability. The scores of each section are added together for a total score of 50. Medical Necessity:   · Patient demonstrates good rehab potential due to higher previous functional level. Reason for Services/Other Comments:  · Patient continues to require modification of therapeutic interventions to increase complexity of exercises. PT Patient Time In/Time Out  Time In: 1030  Time Out: 1115  Rehabilitation Potential For Stated Goals: Good  Regarding Sofy Peck's therapy, I certify that the treatment plan above will be carried out by a therapist or under their direction. Thank you for this referral,  Hayley Vazquez, PT     Referring Physician Signature: Denisha Dutta MD              Date                    The information in this section was collected on 10-30-19 (except where otherwise noted). HISTORY:   History of Present Injury/Illness (Reason for Referral):  Pt reports gradual insidious onset  Past Medical History/Comorbidities:   Mr. Ayan Torres  has a past medical history of Atrial fibrillation ([address] Cancer ([address] Chronic obstructive pulmonary disease ([address] COPD (chronic obstructive pulmonary disease) ([address] (5/13/2016), Erectile dysfunction, Essential hypertension (5/13/2016), Hypercholesterolemia, Hyperlipidemia (5/13/2016), Hypertension, Malignant neoplasm of prostate ([address] (5/13/2016), Paroxysmal atrial fibrillation ([address] (5/13/2016), Peripheral vascular disease ([address] Shortness of breath dyspnea (5/13/2016), and Unspecified sleep apnea. Mr. Ayan Torres  has a past surgical history that includes hx vasectomy; hx orthopaedic (1996); and hx colonoscopy.   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 All Rights Reserved. [facility] Patent #[id number]. Federal Law prohibits the replication, distribution or use without written permission from [facility] [employer]     Current Medications:       Current Outpatient Medications:     amLODIPine-benazepril (LOTREL) 2.5-10 mg per capsule, Take 1 Cap by mouth daily. , Disp: , Rfl:     dronedarone (MULTAQ) tab tablet, TAKE 1 TABLET TWICE A DAY WITH MEALS (Patient taking differently: Take 400 mg by mouth two (2) times daily (with meals). TAKE 1 TABLET TWICE A DAY WITH MEALS), Disp: 180 Tab, Rfl: 3    apixaban (ELIQUIS) 2.5 mg tablet, Take 2.5 mg by mouth two (2) times a day., Disp: , Rfl:     fluocinonide (LIDEX) 0.05 % topical gel, Apply  to affected area two (2) times a day., Disp: , Rfl:     bimatoprost (LUMIGAN) 0.01 % ophthalmic drops, Administer 1 Drop to both eyes every evening., Disp: , Rfl:     albuterol (PROAIR HFA) 90 mcg/actuation inhaler, Take 2 Puffs by inhalation every six (6) hours as needed. , Disp: , Rfl:     fluticasone-salmeterol (ADVAIR DISKUS) 250-50 mcg/dose diskus inhaler, Take 1 puff by inhalation every twelve (12) hours. , Disp: , Rfl:     tiotropium (SPIRIVA WITH HANDIHALER) 18 mcg inhalation capsule, Take 1 capsule by inhalation daily. , Disp: , Rfl:    Date Last Reviewed:  10/30/2019   Number of Personal Factors/Comorbidities that affect the Plan of Care: 0: LOW COMPLEXITY   EXAMINATION:   Observation/Orthostatic Postural Assessment:          Flattened lumbar curve  Palpation:          Mild TTP left piriformis  ROM:          80% ROM all planes of lumbar with mild discomfort at end range of right lateral flexion  Strength:          5/5 for age BLE  Special Tests:          SLR mildly (+) on left   Body Structures Involved:  1. Joints  2.  Muscles Body Functions Affected:  1. Movement Related Activities and Participation Affected:  1. General Tasks and Demands   Number of elements (examined above) that affect the Plan of Care: 1-2: LOW COMPLEXITY   CLINICAL PRESENTATION:   Presentation: Stable and uncomplicated: LOW COMPLEXITY      Use of outcome tool(s) and clinical judgement create a POC that gives a: Clear prediction of patient's progress: LOW COMPLEXITY                    Treatment/Session Assessment:    · Response to Treatment:  Pt reports understanding of and agreement with treatment plan. · Compliance with Program/Exercises: Will assess as treatment progresses. · Recommendations/Intent for next treatment session: \"Next visit will focus on advancements to more challenging activities\". Future Appointments   Date Time Provider Tiago Sheridani   11/5/2019  4:30 PM Darion Velasco, PT [facility]   11/7/2019 11:15 AM Moe Chicas, PT [facility]   11/12/2019  1:45 PM Moe Chicas PT [facility]   11/14/2019  5:15 PM Moe Chicas PT [facility]   11/20/2019 10:15 AM GVLLE NUCLEAR STRESS [facility]   2/5/2020 10:45 AM GVLLE REMOTE PACER 34 [facility]   3/10/2020 10:30 AM [facility] ULTRASOUND 1 [facility]   3/10/2020 11:00 AM Ted Irwin MD [facility]   4/24/2020 11:15 AM Jethro De Leon MD [facility]     Please explain any variance from Plan of Care.   Total Treatment Duration: 25 min maru Perla, PT

## 2019-10-30 NOTE — PROGRESS NOTES
Adela Rush  : 1933  [place]  Secondary: [address] [facility]  [address], [address], [place] [address]  [phone]   [phone]                                  OUTPATIENT PHYSICAL THERAPY: Daily Treatment Note 10/30/2019  ICD-10: Treatment Diagnosis: low back pain (M54.5)  Sciatica, left side (M54.32)  Precautions/Allergies:   Clarithromycin; Neuromuscular blockers, steroidal; and Sulfa (sulfonamide antibiotics)   TREATMENT PLAN:  Effective Dates: 10/30/2019 TO 2019 (30 days). Frequency/Duration: 2 times a week for 30 Day(s)     MEDICAL/REFERRING DIAGNOSIS:  Low back pain   DATE OF ONSET: 7 months  REFERRING PHYSICIAN: Laila Castillo MD MD Orders: Trygve Beer and Treat  Return MD Appointment:December     Pre-treatment Symptoms/Complaints:  Niyah Peck reports pain and tingling in his back and left leg mainly in the AM.     Pain: Initial: Pain Intensity 1: 1  Pain Location 1: Back, Leg  Pain Orientation 1: Left  Post Session:  1/10   Medications Last Reviewed:  10/30/2019  Updated Objective Findings:     TREATMENT:   THERAPEUTIC EXERCISE: (15 minutes):  Exercises per grid below to improve mobility, strength and coordination. Required minimal verbal cues to promote proper body mechanics. Progressed resistance, range and repetitions as indicated. Access Code: [facility]   URL: [email or web address]   Date: 10/30/2019   Prepared by:  Levar Hernandez     Exercises   Supine Lower Trunk Rotation - 10 reps - 2x daily   Supine Single Knee to Chest Stretch - 10 reps - 3 hold - 2x daily   Supine Piriformis Stretch with Foot on Ground - 10 reps - 3 hold - 2x daily   Sidelying Open Book Thoracic Rotation with Knee on Foam Roll - 10 reps - 2x daily   Standing Lumbar Extension - 10 reps - 1 sets - 2x daily      Date:  10-30-19 Date: Date:     Activity/Exercise Parameters Parameters Parameters   HEP As above                                           Treatment/Session Summary:    · Response to Treatment: Sydnee Rodríguez demonstrates good understanding of initial HEP. · Communication/Consultation:  HEP  · Equipment provided today:  Instruction sheet  · Recommendations/Intent for next treatment session: Next visit will focus on lumbar stabilization.     Total Treatment Billable Duration:  15 min therex  PT Patient Time In/Time Out  Time In: 1030  Time Out: 1134  Ernesto Dancer, PT    Future Appointments   Date Time Provider Tiago Roberson   11/5/2019  4:30 PM Esperanza Nina, PT [facility]   11/7/2019 11:15 AM Rach Acosta, PT [facility]   11/12/2019  1:45 PM Dennis Mcgowan, PT [facility]   11/14/2019  5:15 PM Dennis Mcgowan, PT [facility]   11/20/2019 10:15 [facility]   2/5/2020 10:45 AM [facility]OTE PACER [facility]   3/10/2020 10:30 AM [facility] ULTRASOUND 1 [facility]   3/10/2020 11:00 AM Leah Espinosa MD [facility]   4/24/2020 11:15 AM Sherita Cutler MD [facility]

## 2019-11-05 ENCOUNTER — HOSPITAL ENCOUNTER (OUTPATIENT)
Dept: PHYSICAL THERAPY | Age: 84
Discharge: HOME | End: 2019-11-05
Payer: MEDICARE

## 2019-11-05 PROCEDURE — 97110 THERAPEUTIC EXERCISES: CPT

## 2019-11-05 PROCEDURE — 97140 MANUAL THERAPY 1/> REGIONS: CPT

## 2019-11-05 NOTE — PROGRESS NOTES
Nery Charles  : 1933  [place]  Secondary: [address] [facility]  [address] , [address], [place] [address]  [phone]   [phone]                                  OUTPATIENT PHYSICAL THERAPY: Daily Treatment Note 2019  ICD-10: Treatment Diagnosis: low back pain (M54.5)  Sciatica, left side (M54.32)  Precautions/Allergies:   Clarithromycin; Neuromuscular blockers, steroidal; and Sulfa (sulfonamide antibiotics)   TREATMENT PLAN:  Effective Dates: 10/30/2019 TO 2019 (30 days). Frequency/Duration: 2 times a week for 30 Day(s)     MEDICAL/REFERRING DIAGNOSIS:  Low back pain   DATE OF ONSET: 7 months  REFERRING PHYSICIAN: Aline Herrera MD MD Orders: Eval and Treat  Return MD Appointment:December     Pre-treatment Symptoms/Complaints:  Mary Peck reports pain and tingling in his back and both legs. Notes his pain is worse than it was prior to starting the exercises. Pain: Initial: Pain Intensity 1: 3  Post Session:  2/10   Medications Last Reviewed:  2019  Updated Objective Findings:     TREATMENT:   THERAPEUTIC EXERCISE: (30 minutes):  Exercises per grid below to improve mobility, strength and coordination. Required minimal verbal cues to promote proper body mechanics. Progressed resistance, range and repetitions as indicated. Access Code: [facility]   URL: [email or web address]   Date: 10/30/2019   Prepared by:  Levar Hernandez     Exercises   Supine Lower Trunk Rotation - 10 reps - 2x daily   Supine Single Knee to Chest Stretch - 10 reps - 3 hold - 2x daily   Supine Piriformis Stretch with Foot on Ground - 10 reps - 3 hold - 2x daily   Sidelying Open Book Thoracic Rotation with Knee on Foam Roll - 10 reps - 2x daily   Standing Lumbar Extension - 10 reps - 1 sets - 2x daily      Date:  10-30-19 Date:  11/5/19 Date:     Activity/Exercise Parameters Parameters Parameters   HEP As above     NuStep  10 mins, 2.0     LTR  10x     Piriformis stretch  3x B    KTC  3x B    Bridge  10x     SLR  10x B    Step ups  10x B 6 inch step    Side step ups  10x B 6 inch step    Calf stretch      MANUAL (10 mins) - STM to L anterior tib to decrease pain/spasm and tightness  MODALITIES (3 mins) - moist heat to LB to decrease pain and tightness  Treatment/Session Summary:    · Response to Treatment: Taniya Cavanaughonds educated to quit doing the lumbar extension to see if that helps his pain levels. He was also educated to perform HEP in bed prior to getting up in the morning to see if that helps his pain levels. He had a slight reduction in pain following today's session. · Communication/Consultation:  HEP  · Equipment provided today:  Instruction sheet  · Recommendations/Intent for next treatment session: Next visit will focus on lumbar stabilization.     Total Treatment Billable Duration:  30 min therex, 10 manual   PT Patient Time In/Time Out  Time In: 1630  Time Out: [address] [place] [address]    Future Appointments   Date Time Provider Tiago Roberson   11/7/2019 11:15 AM Cuca Joe PT [facility]   11/12/2019  1:45 PM Cuca Joe PT [facility]   11/14/2019  5:15 PM Cuca Joe PT [facility]   11/20/2019 10:15 AM GVLLE NUCLEAR STRESS [facility]   2/5/2020 10:45 AM GVLLE REMOTE PACER 34 [facility]   3/10/2020 10:30 AM [facility]SA ULTRASOUND 1 [facility]   3/10/2020 11:00 AM Jessica Paul MD [facility]   4/24/2020 11:15 AM Michelle Isaacs MD [facility]

## 2019-11-07 ENCOUNTER — HOSPITAL ENCOUNTER (OUTPATIENT)
Dept: PHYSICAL THERAPY | Age: 84
Discharge: HOME | End: 2019-11-07
Payer: MEDICARE

## 2019-11-07 PROCEDURE — 97110 THERAPEUTIC EXERCISES: CPT

## 2019-11-07 NOTE — PROGRESS NOTES
Timbo Ramos  : 1933  [place]  Secondary: [address] [facility]  [address] , [address], [place] [address]  [phone]   [phone]                                  OUTPATIENT PHYSICAL THERAPY: Daily Treatment Note 2019  ICD-10: Treatment Diagnosis: low back pain (M54.5)  Sciatica, left side (M54.32)  Precautions/Allergies:   Clarithromycin; Neuromuscular blockers, steroidal; and Sulfa (sulfonamide antibiotics)   TREATMENT PLAN:  Effective Dates: 10/30/2019 TO 2019 (30 days). Frequency/Duration: 2 times a week for 30 Day(s)     MEDICAL/REFERRING DIAGNOSIS:  Low back pain   DATE OF ONSET: 7 months  REFERRING PHYSICIAN: Franklin Sharp MD MD Orders: Eval and Treat  Return MD Appointment:December     Pre-treatment Symptoms/Complaints:  Timbo Ramos reports he is still very stiff and sore in early AM.    Pain: Initial: Pain Intensity 1: 2  Pain Location 1: Back, Leg  Post Session:  2/10   Medications Last Reviewed:  2019  Updated Objective Findings:     TREATMENT:   THERAPEUTIC EXERCISE: (40 minutes):  Exercises per grid below to improve mobility, strength and coordination. Required minimal verbal cues to promote proper body mechanics. Progressed resistance, range and repetitions as indicated. Access Code: [facility]   URL: [email or web address] [email or web address]   Date: 10/30/2019   Prepared by:  Levar Hernandez     Exercises   Supine Lower Trunk Rotation - 10 reps - 2x daily   Supine Single Knee to Chest Stretch - 10 reps - 3 hold - 2x daily   Supine Piriformis Stretch with Foot on Ground - 10 reps - 3 hold - 2x daily   Sidelying Open Book Thoracic Rotation with Knee on Foam Roll - 10 reps - 2x daily   Standing Lumbar Extension - 10 reps - 1 sets - 2x daily     10 x  Date:  10-30-19 Date:  19 Date:  19   Activity/Exercise Parameters Parameters Parameters   HEP As above     NuStep  10 mins, 2.0  Level 2 x 10'   LTR  10x  10x   Piriformis stretch  3x B    KTC  3x B    Bridge  10x  10x   SLR  10x B    Step ups  10x B 6 inch step 10x B 6 inch step   Side step ups  10x B 6 inch step 10x B 6 inch step   Supine hamstring stretch stretch   X 10    Kettle ball  Seated rotations   6# x 10   Kettle ball sit to stand   6# x 10   MANUAL (0 mins) - STM to L anterior tib to decrease pain/spasm and tightness  MODALITIES (5 mins indirect) - moist heat to LB to decrease pain and tightness  Treatment/Session Summary:    · Response to Treatment: Adela Rush still very focused on his AM stiffness. We spoke at length that this might not be an overnight fix. · Communication/Consultation:  HEP  · Equipment provided today:  Instruction sheet  · Recommendations/Intent for next treatment session: Next visit will focus on lumbar stabilization.     Total Treatment Billable Duration:  40 min therex,   PT Patient Time In/Time Out  Time In: 1115  Time Out: 1200  Liang Boothe PT    Future Appointments   Date Time Provider Tiago Roberson   11/12/2019  1:45 PM Esau Prakash, PT [facility]   11/19/2019  3:45 PM Esau Prakash, PT [facility]   11/20/2019 10:15 AM GVLLE NUCLEAR STRESS [facility]   2/5/2020 10:45 AM GVLLE REMOTE PACER 34 [facility]   3/10/2020 10:30 AM [facility]SA ULTRASOUND 1 [facility]   3/10/2020 11:00 AM Shanique Kramer MD [facility]   4/24/2020 11:15 AM Marisela White MD [facility]

## 2019-11-12 ENCOUNTER — HOSPITAL ENCOUNTER (OUTPATIENT)
Dept: PHYSICAL THERAPY | Age: 84
Discharge: HOME | End: 2019-11-12
Payer: MEDICARE

## 2019-11-12 PROCEDURE — 97110 THERAPEUTIC EXERCISES: CPT

## 2019-11-13 NOTE — PROGRESS NOTES
Lukas Soares  : 1933  [place]  Secondary: [address] [facility]  [address] , [address], [place] [address]  [phone]   [phone]                                  OUTPATIENT PHYSICAL THERAPY: Daily Treatment Note 2019  ICD-10: Treatment Diagnosis: low back pain (M54.5)  Sciatica, left side (M54.32)  Precautions/Allergies:   Clarithromycin; Neuromuscular blockers, steroidal; and Sulfa (sulfonamide antibiotics)   TREATMENT PLAN:  Effective Dates: 10/30/2019 TO 2019 (30 days). Frequency/Duration: 2 times a week for 30 Day(s)     MEDICAL/REFERRING DIAGNOSIS:  Low back pain   DATE OF ONSET: 7 months  REFERRING PHYSICIAN: Elliott Loving MD MD Orders: Eval and Treat  Return MD Appointment:December     Pre-treatment Symptoms/Complaints:  Lukas Soares continues to report he is still very stiff and sore in early AM.    Pain: Initial: Pain Intensity 1: 1  Pain Location 1: Back  Post Session:  1/10   Medications Last Reviewed:  2019  Updated Objective Findings:     TREATMENT:   THERAPEUTIC EXERCISE: (40 minutes):  Exercises per grid below to improve mobility, strength and coordination. Required minimal verbal cues to promote proper body mechanics. Progressed resistance, range and repetitions as indicated. Access Code: [facility]   URL: [email or web address]   Date: 10/30/2019   Prepared by:  Levar Hernandez     Exercises   Supine Lower Trunk Rotation - 10 reps - 2x daily   Supine Single Knee to Chest Stretch - 10 reps - 3 hold - 2x daily   Supine Piriformis Stretch with Foot on Ground - 10 reps - 3 hold - 2x daily   Sidelying Open Book Thoracic Rotation with Knee on Foam Roll - 10 reps - 2x daily   Standing Lumbar Extension - 10 reps - 1 sets - 2x daily     10 x  Date:  10-30-19 Date:  19 Date:  19 Date:  11/12/19   Activity/Exercise Parameters Parameters Parameters Parameters   HEP As above      NuStep  10 mins, 2.0  Level 2 x 10' Level 2 x 10'   Incline calf    10 x 5\"   LTR  10x  10x 10x   Piriformis stretch  3x B  10 x 5\" with overpressure   KTC  3x B  10 x 5\" BLE   Bridge  10x  10x X 10   SLR  10x B     Step ups  10x B 6 inch step 10x B 6 inch step 10x B 6 inch step   Side step ups  10x B 6 inch step 10x B 6 inch step 10x B 6 inch step   Standing ext over fulcrum    X 10   Supine hamstring stretch stretch   X 10  X 10   Kettle ball  Seated rotations   6# x 10    Kettle ball sit to stand   6# x 10    MANUAL (5 mins) - roll bar to left IT band  MODALITIES (5 mins indirect) - moist heat to LB to decrease pain and tightness  Treatment/Session Summary:    · Response to Treatment: Tricia Menezes  focused moreon leg stretches today. · Communication/Consultation:  HEP  · Equipment provided today:  Instruction sheet  · Recommendations/Intent for next treatment session: Next visit will focus on lumbar stabilization.     Total Treatment Billable Duration:  40 min therex,   PT Patient Time In/Time Out  Time In: 8556  Time Out: 1430  Atilio Crawford PT    Future Appointments   Date Time Provider Tiago Roberson   11/18/2019  3:15 PM Rosa Riley PT [facility]   11/20/2019 10:15 AM [facility]   2/5/2020 10:45 AM [facility]   3/10/2020 10:30 AM [facility]SA ULTRASOUND 1 [facility]   3/10/2020 11:00 AM Lizzeth Lawrence MD [facility]   4/24/2020 11:15 AM [facility]

## 2019-11-18 ENCOUNTER — HOSPITAL ENCOUNTER (OUTPATIENT)
Dept: PHYSICAL THERAPY | Age: 84
Discharge: HOME | End: 2019-11-18
Payer: MEDICARE

## 2019-11-18 PROCEDURE — 97110 THERAPEUTIC EXERCISES: CPT

## 2019-11-19 ENCOUNTER — APPOINTMENT (OUTPATIENT)
Dept: PHYSICAL THERAPY | Age: 84
End: 2019-11-19
Payer: MEDICARE

## 2019-11-19 NOTE — PROGRESS NOTES
Shreya Moore  : 1933  [place]  Secondary: [address] [facility]  [address] , [address], [place] [address]  [phone]   [phone]                                  OUTPATIENT PHYSICAL THERAPY: Daily Treatment Note 2019  ICD-10: Treatment Diagnosis: low back pain (M54.5)  Sciatica, left side (M54.32)  Precautions/Allergies:   Clarithromycin; Neuromuscular blockers, steroidal; and Sulfa (sulfonamide antibiotics)   TREATMENT PLAN:  Effective Dates: 10/30/2019 TO 2019 (30 days). Frequency/Duration: 2 times a week for 30 Day(s)     MEDICAL/REFERRING DIAGNOSIS:  Low back pain   DATE OF ONSET: 7 months  REFERRING PHYSICIAN: Celio Horton MD MD Orders: [place]  and Treat  Return MD Appointment:December     Pre-treatment Symptoms/Complaints:  Shreya Moore continues to report overall improvement, but still  is still very stiff and sore in early AM.    Pain: Initial: Pain Intensity 1: 1  Pain Location 1: Leg  Post Session:  1/10   Medications Last Reviewed:  2019  Updated Objective Findings:     TREATMENT:   THERAPEUTIC EXERCISE: (40 minutes):  Exercises per grid below to improve mobility, strength and coordination. Required minimal verbal cues to promote proper body mechanics. Progressed resistance, range and repetitions as indicated. Access Code: [facility]   URL: [email or web address]   Date: 10/30/2019   Prepared by:  Levar Hernandez     Exercises   Supine Lower Trunk Rotation - 10 reps - 2x daily   Supine Single Knee to Chest Stretch - 10 reps - 3 hold - 2x daily   Supine Piriformis Stretch with Foot on Ground - 10 reps - 3 hold - 2x daily   Sidelying Open Book Thoracic Rotation with Knee on Foam Roll - 10 reps - 2x daily   Standing Lumbar Extension - 10 reps - 1 sets - 2x daily     10 x  Date:  10-30-19 Date:  11/5/19 Date:  11/7/19 Date:  11/12/19 Date:  11/18/19   Activity/Exercise Parameters Parameters Parameters Parameters    HEP As above       NuStep  10 mins, 2.0  Level 2 x 10' Level 2 x 10' Level 2 x 10'   Incline calf    10 x 5\" 10 x 5\"   LTR  10x  10x 10x 10x   Piriformis stretch  3x B  10 x 5\" with overpressure 10 x 5\" with overpressure   KTC  3x B  10 x 5\" BLE 10 x 5\" BLE   Bridge  10x  10x X 10 X 10   SLR  10x B      Step ups  10x B 6 inch step 10x B 6 inch step 10x B 6 inch step 10x B 6 inch step   Side step ups  10x B 6 inch step 10x B 6 inch step 10x B 6 inch step 10x B 6 inch step   Ant heel taps     10x B 6 inch step   Standing ext over fulcrum    X 10    Supine hamstring stretch stretch   X 10  X 10    Kettle ball  Seated rotations   6# x 10     Kettle ball sit to stand   6# x 10     Ankle 4 way     RTB  4 x 10   MANUAL (0 mins) - roll bar to left IT band  MODALITIES (0 mins indirect) - moist heat to LB to decrease pain and tightness  Treatment/Session Summary:    · Response to Treatment: Jhoana Toure  Requests 2 weeks to work independently with HEP  · Communication/Consultation:  HEP  · Equipment provided today:  Instruction sheet  · Recommendations/Intent for next treatment session: Will evaluate response to independent HEP.     Total Treatment Billable Duration:  40 min therex,   PT Patient Time In/Time Out  Time In: 1748  Time Out: 1600  Irene Arteaga PT    Future Appointments   Date Time Provider Tiago Roberson   11/20/2019 10:15 AM GVLLE NUCLEAR STRESS [facility]   2/5/2020 10:45 AM GVLLE REMOTE PACER 34 [facility]   3/10/2020 10:30 AM [facility] ULTRASOUND 1 [facility]   3/10/2020 11:00 AM Thi Steve MD [facility]   4/24/2020 11:15 AM Alisha Padron MD [facility]

## 2020-05-07 PROBLEM — Z95.0 PACEMAKER: Status: ACTIVE | Noted: 2020-05-07

## 2020-11-09 ENCOUNTER — TELEPHONE (OUTPATIENT)
Dept: CARDIAC CATH/INVASIVE PROCEDURES | Age: 85
End: 2020-11-09

## 2020-11-09 DIAGNOSIS — I48.0 PAROXYSMAL ATRIAL FIBRILLATION (HCC): Primary | ICD-10-CM

## 2020-11-09 NOTE — PROGRESS NOTES
Patient pre-assessment complete for AV Node ablation with Dr Joe Fields scheduled for 20 ay 11:30am, arrival time 9:30am. Patient verified using . Patient instructed to bring all home medications in labeled bottles on the day of procedure. NPO status reinforced. Patient instructed to HOLD eliquis & lotrel the am of procedure. Instructed they can take all other medications excluding vitamins & supplements. Patient verbalizes understanding of all instructions & denies any questions at this time.

## 2020-11-10 ENCOUNTER — HOSPITAL ENCOUNTER (OUTPATIENT)
Dept: LAB | Age: 85
Discharge: HOME | End: 2020-11-10
Payer: MEDICARE

## 2020-11-10 DIAGNOSIS — I48.0 PAROXYSMAL ATRIAL FIBRILLATION (HCC): ICD-10-CM

## 2020-11-10 LAB
ANION GAP SERPL CALC-SCNC: 5 MMOL/L (ref 7–16)
BUN SERPL-MCNC: 27 MG/DL (ref 8–23)
CALCIUM SERPL-MCNC: 9.3 MG/DL (ref 8.3–10.4)
CHLORIDE SERPL-SCNC: 111 MMOL/L (ref 98–107)
CO2 SERPL-SCNC: 25 MMOL/L (ref 21–32)
CREAT SERPL-MCNC: 2.03 MG/DL (ref 0.8–1.5)
ERYTHROCYTE [DISTWIDTH] IN BLOOD BY AUTOMATED COUNT: 13.7 % (ref 11.9–14.6)
GLUCOSE SERPL-MCNC: 88 MG/DL (ref 65–100)
HCT VFR BLD AUTO: 39.9 % (ref 41.1–50.3)
HGB BLD-MCNC: 13.2 G/DL (ref 13.6–17.2)
INR PPP: 1.2
MAGNESIUM SERPL-MCNC: 2.2 MG/DL (ref 1.8–2.4)
MCH RBC QN AUTO: 31.1 PG (ref 26.1–32.9)
MCHC RBC AUTO-ENTMCNC: 33.1 G/DL (ref 31.4–35)
MCV RBC AUTO: 94.1 FL (ref 79.6–97.8)
NRBC # BLD: 0 K/UL (ref 0–0.2)
PLATELET # BLD AUTO: 201 K/UL (ref 150–450)
PMV BLD AUTO: 9.6 FL (ref 9.4–12.3)
POTASSIUM SERPL-SCNC: 4.7 MMOL/L (ref 3.5–5.1)
PROTHROMBIN TIME: 15.4 SEC (ref 12.5–14.7)
RBC # BLD AUTO: 4.24 M/UL (ref 4.23–5.6)
SODIUM SERPL-SCNC: 141 MMOL/L (ref 138–145)
WBC # BLD AUTO: 6.6 K/UL (ref 4.3–11.1)

## 2020-11-10 PROCEDURE — 36415 COLL VENOUS BLD VENIPUNCTURE: CPT

## 2020-11-10 PROCEDURE — 85610 PROTHROMBIN TIME: CPT

## 2020-11-10 PROCEDURE — 83735 ASSAY OF MAGNESIUM: CPT

## 2020-11-10 PROCEDURE — 85027 COMPLETE CBC AUTOMATED: CPT

## 2020-11-10 PROCEDURE — 80048 BASIC METABOLIC PNL TOTAL CA: CPT

## 2020-11-11 ENCOUNTER — ANESTHESIA EVENT (OUTPATIENT)
Dept: SURGERY | Age: 85
End: 2020-11-11
Payer: MEDICARE

## 2020-11-11 RX ORDER — ATORVASTATIN CALCIUM 40 MG/1
TABLET, FILM COATED ORAL DAILY
COMMUNITY
End: 2021-05-12

## 2020-11-12 ENCOUNTER — ANESTHESIA (OUTPATIENT)
Dept: SURGERY | Age: 85
End: 2020-11-12
Payer: MEDICARE

## 2020-11-12 ENCOUNTER — HOSPITAL ENCOUNTER (OUTPATIENT)
Age: 85
Setting detail: OUTPATIENT SURGERY
Discharge: HOME | End: 2020-11-12
Attending: INTERNAL MEDICINE | Admitting: INTERNAL MEDICINE
Payer: MEDICARE

## 2020-11-12 ENCOUNTER — APPOINTMENT (OUTPATIENT)
Dept: CARDIAC CATH/INVASIVE PROCEDURES | Age: 85
End: 2020-11-12
Payer: MEDICARE

## 2020-11-12 VITALS
HEART RATE: 76 BPM | OXYGEN SATURATION: 95 % | RESPIRATION RATE: 12 BRPM | BODY MASS INDEX: 25.48 KG/M2 | DIASTOLIC BLOOD PRESSURE: 74 MMHG | SYSTOLIC BLOOD PRESSURE: 139 MMHG | TEMPERATURE: 98.5 F | HEIGHT: 71 IN | WEIGHT: 182 LBS

## 2020-11-12 DIAGNOSIS — I48.0 PAROXYSMAL ATRIAL FIBRILLATION (HCC): ICD-10-CM

## 2020-11-12 LAB
ANION GAP SERPL CALC-SCNC: 8 MMOL/L (ref 7–16)
ATRIAL RATE: 60 BPM
BUN SERPL-MCNC: 30 MG/DL (ref 8–23)
CALCIUM SERPL-MCNC: 8.9 MG/DL (ref 8.3–10.4)
CALCULATED R AXIS, ECG10: -42 DEGREES
CALCULATED T AXIS, ECG11: 30 DEGREES
CHLORIDE SERPL-SCNC: 113 MMOL/L (ref 98–107)
CO2 SERPL-SCNC: 21 MMOL/L (ref 21–32)
CREAT SERPL-MCNC: 1.99 MG/DL (ref 0.8–1.5)
DIAGNOSIS, 93000: NORMAL
ERYTHROCYTE [DISTWIDTH] IN BLOOD BY AUTOMATED COUNT: 13.6 % (ref 11.9–14.6)
GLUCOSE SERPL-MCNC: 92 MG/DL (ref 65–100)
HCT VFR BLD AUTO: 38.5 % (ref 41.1–50.3)
HGB BLD-MCNC: 12.8 G/DL (ref 13.6–17.2)
INR PPP: 1.1
MAGNESIUM SERPL-MCNC: 2.1 MG/DL (ref 1.8–2.4)
MCH RBC QN AUTO: 31.1 PG (ref 26.1–32.9)
MCHC RBC AUTO-ENTMCNC: 33.2 G/DL (ref 31.4–35)
MCV RBC AUTO: 93.4 FL (ref 79.6–97.8)
NRBC # BLD: 0 K/UL (ref 0–0.2)
PLATELET # BLD AUTO: 210 K/UL (ref 150–450)
PMV BLD AUTO: 9.5 FL (ref 9.4–12.3)
POTASSIUM SERPL-SCNC: 5.1 MMOL/L (ref 3.5–5.1)
PROTHROMBIN TIME: 14.1 SEC (ref 12.5–14.7)
Q-T INTERVAL, ECG07: 404 MS
QRS DURATION, ECG06: 92 MS
QTC CALCULATION (BEZET), ECG08: 439 MS
RBC # BLD AUTO: 4.12 M/UL (ref 4.23–5.6)
SODIUM SERPL-SCNC: 142 MMOL/L (ref 138–145)
VENTRICULAR RATE, ECG03: 71 BPM
WBC # BLD AUTO: 6.9 K/UL (ref 4.3–11.1)

## 2020-11-12 PROCEDURE — 74011250636 HC RX REV CODE- 250/636: Performed by: INTERNAL MEDICINE

## 2020-11-12 PROCEDURE — 85610 PROTHROMBIN TIME: CPT

## 2020-11-12 PROCEDURE — 85027 COMPLETE CBC AUTOMATED: CPT

## 2020-11-12 PROCEDURE — 80048 BASIC METABOLIC PNL TOTAL CA: CPT

## 2020-11-12 PROCEDURE — 74011000250 HC RX REV CODE- 250: Performed by: NURSE ANESTHETIST, CERTIFIED REGISTERED

## 2020-11-12 PROCEDURE — 93005 ELECTROCARDIOGRAM TRACING: CPT | Performed by: INTERNAL MEDICINE

## 2020-11-12 PROCEDURE — 74011000250 HC RX REV CODE- 250: Performed by: INTERNAL MEDICINE

## 2020-11-12 PROCEDURE — 77030035291 HC TBNG PMP SMARTABLATE J&J -B

## 2020-11-12 PROCEDURE — 77030013687 HC GD NDL BARD -B

## 2020-11-12 PROCEDURE — C1894 INTRO/SHEATH, NON-LASER: HCPCS

## 2020-11-12 PROCEDURE — C1732 CATH, EP, DIAG/ABL, 3D/VECT: HCPCS

## 2020-11-12 PROCEDURE — 93650 ICAR CATH ABLTJ AV NODE FUNC: CPT

## 2020-11-12 PROCEDURE — 83735 ASSAY OF MAGNESIUM: CPT

## 2020-11-12 PROCEDURE — 93280 PM DEVICE PROGR EVAL DUAL: CPT

## 2020-11-12 PROCEDURE — 77030002912 HC SUT ETHBND J&J -A

## 2020-11-12 PROCEDURE — 74011250636 HC RX REV CODE- 250/636: Performed by: ANESTHESIOLOGY

## 2020-11-12 PROCEDURE — 76060000032 HC ANESTHESIA 0.5 TO 1 HR: Performed by: INTERNAL MEDICINE

## 2020-11-12 PROCEDURE — 93650 ICAR CATH ABLTJ AV NODE FUNC: CPT | Performed by: INTERNAL MEDICINE

## 2020-11-12 PROCEDURE — 74011250636 HC RX REV CODE- 250/636: Performed by: NURSE ANESTHETIST, CERTIFIED REGISTERED

## 2020-11-12 PROCEDURE — 77030027107 HC PTCH EXT REF CARTO3 J&J -F

## 2020-11-12 RX ORDER — LIDOCAINE HYDROCHLORIDE 20 MG/ML
INJECTION, SOLUTION EPIDURAL; INFILTRATION; INTRACAUDAL; PERINEURAL AS NEEDED
Status: DISCONTINUED | OUTPATIENT
Start: 2020-11-12 | End: 2020-11-12 | Stop reason: HOSPADM

## 2020-11-12 RX ORDER — HEPARIN SODIUM 200 [USP'U]/100ML
1000 INJECTION, SOLUTION INTRAVENOUS AS NEEDED
Status: DISCONTINUED | OUTPATIENT
Start: 2020-11-12 | End: 2020-11-12 | Stop reason: HOSPADM

## 2020-11-12 RX ORDER — SODIUM CHLORIDE, SODIUM LACTATE, POTASSIUM CHLORIDE, CALCIUM CHLORIDE 600; 310; 30; 20 MG/100ML; MG/100ML; MG/100ML; MG/100ML
100 INJECTION, SOLUTION INTRAVENOUS CONTINUOUS
Status: DISCONTINUED | OUTPATIENT
Start: 2020-11-12 | End: 2020-11-12 | Stop reason: HOSPADM

## 2020-11-12 RX ORDER — PROPOFOL 10 MG/ML
INJECTION, EMULSION INTRAVENOUS
Status: DISCONTINUED | OUTPATIENT
Start: 2020-11-12 | End: 2020-11-12 | Stop reason: HOSPADM

## 2020-11-12 RX ORDER — SODIUM CHLORIDE 0.9 % (FLUSH) 0.9 %
5-40 SYRINGE (ML) INJECTION AS NEEDED
Status: DISCONTINUED | OUTPATIENT
Start: 2020-11-12 | End: 2020-11-12 | Stop reason: HOSPADM

## 2020-11-12 RX ORDER — CEFAZOLIN SODIUM/WATER 2 G/20 ML
2 SYRINGE (ML) INTRAVENOUS
Status: COMPLETED | OUTPATIENT
Start: 2020-11-12 | End: 2020-11-12

## 2020-11-12 RX ORDER — SODIUM CHLORIDE 0.9 % (FLUSH) 0.9 %
5-40 SYRINGE (ML) INJECTION EVERY 8 HOURS
Status: DISCONTINUED | OUTPATIENT
Start: 2020-11-12 | End: 2020-11-12 | Stop reason: HOSPADM

## 2020-11-12 RX ORDER — PROPOFOL 10 MG/ML
INJECTION, EMULSION INTRAVENOUS AS NEEDED
Status: DISCONTINUED | OUTPATIENT
Start: 2020-11-12 | End: 2020-11-12 | Stop reason: HOSPADM

## 2020-11-12 RX ORDER — LIDOCAINE HYDROCHLORIDE 10 MG/ML
20 INJECTION INFILTRATION; PERINEURAL
Status: DISCONTINUED | OUTPATIENT
Start: 2020-11-12 | End: 2020-11-12 | Stop reason: HOSPADM

## 2020-11-12 RX ADMIN — Medication 2 G: at 12:42

## 2020-11-12 RX ADMIN — LIDOCAINE HYDROCHLORIDE 40 MG: 20 INJECTION, SOLUTION EPIDURAL; INFILTRATION; INTRACAUDAL; PERINEURAL at 12:42

## 2020-11-12 RX ADMIN — PROPOFOL 120 MCG/KG/MIN: 10 INJECTION, EMULSION INTRAVENOUS at 12:42

## 2020-11-12 RX ADMIN — HEPARIN SODIUM 2000 UNITS: 200 INJECTION, SOLUTION INTRAVENOUS at 12:00

## 2020-11-12 RX ADMIN — PROPOFOL 50 MG: 10 INJECTION, EMULSION INTRAVENOUS at 12:42

## 2020-11-12 RX ADMIN — SODIUM CHLORIDE, SODIUM LACTATE, POTASSIUM CHLORIDE, AND CALCIUM CHLORIDE: 600; 310; 30; 20 INJECTION, SOLUTION INTRAVENOUS at 12:43

## 2020-11-12 RX ADMIN — LIDOCAINE HYDROCHLORIDE 20 ML: 10 INJECTION, SOLUTION INFILTRATION; PERINEURAL at 14:04

## 2020-11-12 NOTE — PROGRESS NOTES
Discharge instructions were reviewed with patient. An opportunity was given for questions. All medications were reviewed, and information was given on the new medications. Patient verbalized understanding, and has no questions at this time. Pt to discontinue multaq per Dr. Theresa Brown. Pt verbalizes understanding.

## 2020-11-12 NOTE — PROGRESS NOTES
Pt HOB elevated and food tray/drink given per request. Right groin site C/D/I without bleeding or hematoma.

## 2020-11-12 NOTE — DISCHARGE INSTRUCTIONS
Ablation Discharge Instructions    *Check the puncture site frequently for swelling or bleeding. If you see any bleeding, lie down and apply pressure over the area with a clean town or washcloth. Notify your doctor for any redness, swelling, drainage or oozing from the puncture site. Notify your doctor for any fever or chills. *If the leg with the puncture becomes cold, numb or painful, call [address] Cardiology at  [phone]. *Activity should be limited for the next 48 hours. Climb stairs as little as possible and avoid any stooping, bending or strenuous activity for 48 hours. No heavy lifting (anything over 10 pounds) for three days. *Do not drive for 48 hours. *You may resume your usual diet. Drink more fluids than usual.    *Have a responsible person drive you home and stay with you for at least 24 hours after your ablation. *You may remove the bandage from your Right Groin in 24 hours. You may shower in 24 hours. No tub baths, hot tubs or swimming for one week. Do not place any lotions, creams, powders, ointments over the puncture site for one week. You may place a clean band-aid over the puncture site each day for 5 days. Change this daily.

## 2020-11-12 NOTE — PROCEDURES
Pre-Procedure Diagnosis  1. Persistent atrial fibrillation     Procedure Performed  1. AV node ablation  2. Intracardiac 3D mapping     Anesthesia: MAC      Estimated Blood Loss: Less than 10 mL          Specimens: * No specimens in log *      The procedure, indications, risks, benefits, and alternatives were discussed with the patient and family members, who desired to proceed after questions were answered and informed consent was documented. Procedure: After informed consent was obtained, the patient was brought to the Electrophysiology Laboratory in a fasting state and was prepped and draped in sterile fashion. Local anesthetic was delivered to the region over the right femoral vein. Access x 1 was obtained under ultrasound guidance using a modified Seldinger technique with placement of a short sheath into the right femoral vein. An 3.5 mm Smart Touch Biosense Ramirez irrigated ablation catheter was used with CARTO to create an electroanatomical map of the RA focusing on the His location. The ablation catheter was advanced to the region of the AV junction at the location of the marked His on CARTO and with delivery of RF there was complete heart block. This remained after a 15 minute waiting period. Kemi Mcgraw MD, MS  Clinical Cardiac Electrophysiology  11/12/2020  1:27 PM

## 2020-11-12 NOTE — ANESTHESIA PREPROCEDURE EVALUATION
Relevant Problems   No relevant active problems       Anesthetic History   No history of anesthetic complications            Review of Systems / Medical History  Patient summary reviewed and pertinent labs reviewed    Pulmonary              Pertinent negatives: No COPDSleep apnea: denies.      Neuro/Psych   Within defined limits           Cardiovascular    Hypertension: well controlled        Dysrhythmias (francisca) : atrial fibrillation  PAD and hyperlipidemia    Exercise tolerance: >4 METS     GI/Hepatic/Renal  Within defined limits              Endo/Other  Within defined limits           Other Findings   Comments: ED    0830 prune juice           Physical Exam    Airway  Mallampati: I  TM Distance: 4 - 6 cm  Neck ROM: normal range of motion   Mouth opening: Normal     Cardiovascular    Rhythm: irregular  Rate: normal         Dental    Dentition: Caps/crowns  Comments: Cap right front   Pulmonary  Breath sounds clear to auscultation               Abdominal  Abdominal exam normal       Other Findings            Anesthetic Plan    ASA: 3  Anesthesia type: total IV anesthesia          Induction: Intravenous  Anesthetic plan and risks discussed with: Patient

## 2020-11-12 NOTE — ANESTHESIA POSTPROCEDURE EVALUATION
Procedure(s):  IV NODE ABLATION. total IV anesthesia    Anesthesia Post Evaluation      Multimodal analgesia: multimodal analgesia used between 6 hours prior to anesthesia start to PACU discharge  Patient location during evaluation: PACU  Patient participation: complete - patient participated  Level of consciousness: awake and alert  Pain management: adequate  Airway patency: patent  Anesthetic complications: no  Cardiovascular status: acceptable and hemodynamically stable  Respiratory status: acceptable  Hydration status: acceptable  Post anesthesia nausea and vomiting:  none  Final Post Anesthesia Temperature Assessment:  Normothermia (36.0-37.5 degrees C)      INITIAL Post-op Vital signs:   Vitals Value Taken Time   /94 11/12/2020  2:03 PM   Temp     Pulse 89 11/12/2020  2:11 PM   Resp     SpO2 99 % 11/12/2020  2:11 PM   Vitals shown include unvalidated device data.

## 2020-11-12 NOTE — PROGRESS NOTES
TRANSFER - IN REPORT:    Verbal report received from [address] on Nrey Ramirez X R Jeremías Lyons 9 being received from EP LAB for routine progression of care      Report consisted of patients Situation, Background, Assessment and Recommendations(SBAR). Information from the following report(s) Procedure Summary was reviewed with the receiving nurse. Opportunity for questions and clarification was provided. Assessment completed upon patients arrival to unit and care assumed.

## 2020-11-12 NOTE — PROGRESS NOTES
Patient received to [address] [place] [address] room # 17  Ambulatory from [facility]. Patient scheduled for AV node ablation today with Dr Dharmesh Huang. Procedure reviewed & questions answered, voiced good understanding consent obtained & placed on chart. All medications and medical history reviewed. Will prep patient per orders. Patient & family updated on plan of care. The patient has a fraility score of 4-VULNERABLE, based on patient A&Ox3, patient able to ambulate to room without difficulty, patient does complain of some shortness of breath after exertion.

## 2021-01-05 ENCOUNTER — HOSPITAL ENCOUNTER (OUTPATIENT)
Dept: GENERAL RADIOLOGY | Age: 86
Discharge: HOME | End: 2021-01-05

## 2021-01-05 DIAGNOSIS — M54.9 BACK PAIN: ICD-10-CM

## 2021-04-26 ENCOUNTER — HOSPITAL ENCOUNTER (OUTPATIENT)
Dept: PHYSICAL THERAPY | Age: 86
Discharge: HOME | End: 2021-04-26
Payer: MEDICARE

## 2021-04-26 PROCEDURE — 97110 THERAPEUTIC EXERCISES: CPT

## 2021-04-26 PROCEDURE — 97161 PT EVAL LOW COMPLEX 20 MIN: CPT

## 2021-04-26 NOTE — THERAPY EVALUATION
Ester Sheth : 1933 [place] Secondary: 310 [facility], [address], [place] [address] [phone]   [phone] OUTPATIENT PHYSICAL THERAPY:Initial Assessment 2021 ICD-10: Treatment Diagnosis: M62.81 Generalized weakness R26.2 Difficulty walking Precautions/Allergies:  
Patient has no known allergies. TREATMENT PLAN: 
Effective Dates: 2021 TO 2021 (60 days). Frequency/Duration: 2 times a week for 60 Day(s) MEDICAL/REFERRING DIAGNOSIS: 
General weakness [R53.1] DATE OF ONSET:  REFERRING PHYSICIAN: Vinayak Wade MD MD Orders: Evaluate and treat Return MD Appointment: TBD INITIAL ASSESSMENT:  Mr. Precious Yanez presents with complaints of weakness, decreased balance and pain with walking. Also a decrease in his functional status PROBLEM LIST (Impacting functional limitations): 1. Decreased Strength 2. Decreased ADL/Functional Activities 3. Decreased Ambulation Ability/Technique 4. Decreased Balance 5. Decreased Activity Tolerance 6. Decreased Flexibility/Joint Mobility 7. Decreased [place] with Home Exercise Program INTERVENTIONS PLANNED: (Treatment may consist of any combination of the following) 1. Balance Exercise 2. Gait Training 3. Home Exercise Program (HEP) 4. Neuromuscular Re-education/Strengthening 5. Therapeutic Activites 6. Therapeutic Exercise/Strengthening GOALS: (Goals have been discussed and agreed upon with patient.) Short-Term Functional Goals: Time Frame: 3-4 weeks 1. Independent HEP of lumbar stretches 2. Independent open chain strengthening exercises Discharge Goals: Time Frame: 6-8 weeks 1. Increase strength by at least 1/2 grade to improve ability to rise to stand from commode 2. Improve TUG to less than 10 secs to improve steadiness with walking 3.  Decrease pain to 3/10 to allow for walking at least1/2 mile 4. Improve LEFS to at least 65/80 to allow for return to PLOF 
 
OUTCOME MEASURE:  
Tool Used: Lower Extremity Functional Scale (LEFS) Score:  Initial: 31/80 Most Recent: X/80 (Date: -- ) Interpretation of Score: 20 questions each scored on a 5 point scale with 0 representing \"extreme difficulty or unable to perform\" and 4 representing \"no difficulty\". The lower the score, the greater the functional disability. 80/80 represents no disability. Minimal detectable change is 9 points. MEDICAL NECESSITY:  
· Patient will require skilled therapeutic intervention for improving in strength, balance, gait and functional status overall. REASON FOR SERVICES/OTHER COMMENTS: 
· Patient will require skilled therapeutic intervention to improve strength, gait, balance and restore functional status to PLOF. Total Duration: 25 minutes PT Patient Time In/Time Out Time In: 1100 Time Out: 1145 Rehabilitation Potential For Stated Goals: Good Regarding Justin Peck's therapy, I certify that the treatment plan above will be carried out by a therapist or under their direction. Thank you for this referral, La Shah, PT Referring Physician Signature: Carlos Wyatt MD _______________________________ Date _____________ PAIN/SUBJECTIVE:  
Initial: Pain Intensity 1: 8 Post Session:  8/10 HISTORY:  
History of Injury/Illness (Reason for Referral): 
Patient reports going through a lot of stressful events since 2019 with taking care of his wife, who was admitted to hospice a month. Pt reports a significant decrease in activity level; sitting for very long periods bedside. He reports he used to walk his dog  1/2 mile/day, performed yard work, and enjoyed bowling pre Covid, but feels with these events he had become progressively weaker and less stable at times when walking  His stated goal is to be able to improve his activity level and get to where he was pre Covid.   He performs light household chores but is mostly sedantary. Patient also  reports having complaints of low back pain radiating into both calves worse in the AM, better as the day progresses which limits his activity as well. Patient is referred to PT for evaluation and treatment. Past Medical History/Comorbidities:  
Mr. Prabhjot Monzon  has a past medical history of Atrial fibrillation ([address] Cancer ([address] Chronic obstructive pulmonary disease ([address] COPD (chronic obstructive pulmonary disease) ([address] (5/13/2016), Erectile dysfunction, Essential hypertension (5/13/2016), Hypercholesterolemia, Hyperlipidemia (5/13/2016), Hypertension, Malignant neoplasm of prostate ([address] (5/13/2016), Paroxysmal atrial fibrillation ([address] (5/13/2016), Peripheral vascular disease ([address] Shortness of breath dyspnea (5/13/2016), and Unspecified sleep apnea. He also has no past medical history of Aneurysm ([address] Arthritis, Asthma, Autoimmune disease ([address] CAD (coronary artery disease), Chronic kidney disease, Chronic pain, Coagulation disorder ([address] Diabetes ([address] Difficult intubation, Endocarditis, GERD (gastroesophageal reflux disease), Heart failure ([address] Liver disease, Malignant hyperthermia due to anesthesia, Morbid obesity ([address] Nausea & vomiting, Pseudocholinesterase deficiency, Psychiatric disorder, PUD (peptic ulcer disease), Rheumatic fever, Seizures ([address] Stroke ([address] Thromboembolus ([address] or Thyroid disease. Mr. Prabhjot Monzon  has a past surgical history that includes hx vasectomy; hx orthopaedic (1996); and hx colonoscopy. Social History/Living Environment:  
 Lives alone in single story home Prior Level of Function/Work/Activity: 
Independent in all ADL's enjoyed walking daily and bowling Dominant Side: LEFT Ambulatory/Rehab Services H2 Model Falls Risk Assessment Risk Factors: 
     (1)  Gender [Male] Ability to Rise from Chair: 
     (1)  Pushes up, successful in one attempt Falls Prevention Plan:      No modifications necessary Total: (5 or greater = High Risk): 2  
 [facility] [address] [place] [address] Patent #[id number]. Federal Law prohibits the replication, distribution or use without written permission from [facility] [place] Current Medications:   
  
Current Outpatient Medications:   Trelegy Ellipta 100-62.5-25 mcg inhaler, , Disp: , Rfl:  
  apixaban (Eliquis) 2.5 mg tablet, Take 1 Tab by mouth two (2) times a day., Disp: 180 Tab, Rfl: 3 
  amLODIPine-benazepril (LOTREL) 2.5-10 mg per capsule, Take 1 Cap by mouth daily. , Disp: 90 Cap, Rfl: 3 
  atorvastatin (LIPITOR) 40 mg tablet, Take  by mouth daily. , Disp: , Rfl:  
  cholecalciferol (Vitamin D3) (1000 Units /25 mcg) tablet, Take  by mouth daily. , Disp: , Rfl:  
  fluticasone propionate (Flonase Allergy Relief) 50 mcg/actuation nasal spray, 2 Sprays by Both Nostrils route daily. , Disp: , Rfl:  
  fluocinonide (LIDEX) 0.05 % topical gel, Apply  to affected area two (2) times daily as needed. , Disp: , Rfl:  
  bimatoprost (LUMIGAN) 0.01 % ophthalmic drops, Administer 1 Drop to both eyes every evening., Disp: , Rfl:  
  albuterol (PROAIR HFA) 90 mcg/actuation inhaler, Take 2 Puffs by inhalation every six (6) hours as needed. , Disp: , Rfl:   
Date Last Reviewed:  21 Number of Personal Factors/Comorbidities that affect the Plan of Care: 0: LOW COMPLEXITY EXAMINATION:  
Observation/Orthostatic Postural Assessment:   
      Slightly flexed posture Gait with decreased ct, mild unsteadiness with quick turns ROM:   
 Date: 
21 Right Left Lumbar flexion 45 Lumbar extension 0 Side bending 50% 50% Rotation 50% 50% Hip ER 45 30 Hip flex 105 105 Hamstring flexibility moderate moderate Strength:   
 Date: 
21 Right Left Hip flexion 4 5- Hip abduction 4- 4 Knee extension 5 5 Knee flexion 4+ 4 Special Tests:   
      TU.57   SLS right 9 sec   Left 10 unsteady Body Structures Involved: 1. Bones 2. Joints 3. Muscles 4. Ligaments Body Functions Affected: 1. Sensory/Pain 2. Neuromusculoskeletal 
3. Movement Related Activities and Participation Affected: 1. Learning and Applying Knowledge 2. General Tasks and Demands 3. Mobility 4. Domestic Life Number of elements (examined above) that affect the Plan of Care: 1-2: LOW COMPLEXITY CLINICAL PRESENTATION:  
Presentation: Stable and uncomplicated: LOW COMPLEXITY CLINICAL DECISION MAKING:  
Use of outcome tool(s) and clinical judgement create a POC that gives a: Clear prediction of patient's progress: LOW COMPLEXITY

## 2021-04-27 NOTE — PROGRESS NOTES
Mari Bobby  : 1933  [place]  Secondary: 310 [facility]  [address] , [address], [place] [address]  [phone]   [phone]      OUTPATIENT PHYSICAL THERAPY: Daily Treatment Note 2021  Visit Count:  1    ICD-10: Treatment Diagnosis: M62.81 Generalized weakness  R26.2 Difficulty walking  Precautions/Allergies:   Patient has no known allergies. TREATMENT PLAN:  Effective Dates: 2021 TO 2021 (60 days). Frequency/Duration: 2 times a week for 60 Day(s) MEDICAL/REFERRING DIAGNOSIS:  General weakness [R53.1]   DATE OF ONSET:   REFERRING PHYSICIAN: Anselmo Alan MD MD Orders: Evaluate and treat  Return MD Appointment: TBD       Pre-treatment Symptoms/Complaints:   Mr. Ene George presents with complaints of weakness, decreased balance and pain with walking. Also a decrease in his functional status  Pain: Initial: Pain Intensity 1: 10 Post Session:  8/10   Medications Last Reviewed:  2021  Updated Objective Findings:  See evaluation note from today  TREATMENT:     THERAPEUTIC EXERCISE: (15 minutes):  Exercises per grid below to improve mobility. Required minimal verbal and tactile cues to promote proper body alignment. Progressed range as indicated. Date:  21 Date:   Date:     Activity/Exercise Parameters Parameters Parameters   SKTC 2 x 20 sec; B     LTR 10 x10 sec     DKTC 2 x 20 secs     HEP/patient education 5 mins                               [employer] PortalAccess Code: [email or web address]  URL: [email or web address]: repared by: La Zaragoza-Leonorxercises   Supine Lower Trunk Rotation - 1 x daily - 7 x weekly - 1 sets - 10 reps - 10 hold   Hooklying Single Knee to Chest - 1 x daily - 7 x weekly - 3 reps - 20 hold   Supine Double Knee to Chest - 1 x daily - 7 x weekly - 3 reps - 20 hold   Supine Pelvic Tilt - 1 x daily - 7 x weekly - 1 sets - 5 reps - 5 hold    Treatment/Session Summary:    · Response to Treatment:  Patient tolerated treatment well. Very motivated to improve his functionand quality og life.   · Communication/Consultation:  HEP, discussed goals, home environment and equipment to address commode height  · Equipment provided today:  HEP  · Recommendations/Intent for next treatment session: Next visit will focus on strengthening, gait training, balance and proprioceptive training    Total Treatment Billable Duration:  40 minutes (25 minutes evaluation, 20 minutes there ex)  PT Patient Time In/Time Out  Time In: 1100  Time Out: [address] Rosa Isela Nova, PT    Future Appointments   Date Time Provider Tiago Roberson   4/28/2021  4:45 PM Мария Rodriguez, PT [facility]   5/3/2021 11:00 AM La Shah, PT [facility]   5/6/2021 11:15 AM La Shah, PT [facility]   5/10/2021 11:00 AM La Shah, PT [facility]   5/12/2021 11:00 AM La Shah, PT [facility]   5/12/2021  1:30 PM VSA ULTRASOUND 2 [facility]   5/12/2021  2:00 PM Theron Benavides, SAMIA [facility]   5/17/2021 11:00 AM La Shah, PT [facility]   5/19/2021 11:00 AM La Shah, PT [facility]   5/24/2021 11:00 AM La Shah, PT [facility]   5/26/2021 11:00 AM La Shah, PT [facility]   6/1/2021 11:00 AM La Shah, PT [facility]   6/3/2021 11:15 AM La Shah, PT [facility]   6/11/2021  1:00 PM REMOTE PACER 34 GVL SSA [facility]   6/18/2021 11:45 AM Valerie Isidro MD [address]

## 2021-04-28 ENCOUNTER — HOSPITAL ENCOUNTER (OUTPATIENT)
Dept: PHYSICAL THERAPY | Age: 86
Discharge: HOME | End: 2021-04-28
Payer: MEDICARE

## 2021-04-28 PROCEDURE — 97110 THERAPEUTIC EXERCISES: CPT

## 2021-04-28 PROCEDURE — 97140 MANUAL THERAPY 1/> REGIONS: CPT

## 2021-04-28 NOTE — PROGRESS NOTES
Ama Douse  : 1933  [place]  Secondary: [address] [facility]  [address], [address], [place] [address]  [phone]   [phone]      OUTPATIENT PHYSICAL THERAPY: Daily Treatment Note 2021  Visit Count:  2    ICD-10: Treatment Diagnosis: M62.81 Generalized weakness  R26.2 Difficulty walking  Precautions/Allergies:   Patient has no known allergies. TREATMENT PLAN:  Effective Dates: 2021 TO 2021 (60 days). Frequency/Duration: 2 times a week for 60 Day(s) MEDICAL/REFERRING DIAGNOSIS:  General weakness [R53.1]   DATE OF ONSET:   REFERRING PHYSICIAN: Pavel Gonzáles MD MD Orders: Evaluate and treat  Return MD Appointment: TBD       Pre-treatment Symptoms/Complaints:   Mr. Vinny Cobian presents with complaints of being sore after working on shelving  Pain: Initial: Pain Intensity 1: 3 10 Post Session:  2/10   Medications Last Reviewed:  2021  Updated Objective Findings:  None Today  TREATMENT:     THERAPEUTIC EXERCISE: (30 minutes):  Exercises per grid below to improve mobility and strength. Required minimal verbal and tactile cues to promote proper body alignment. Progressed range, repetitions and complexity of movement as indicated. Date:  21 Date:  21 Date:     Activity/Exercise Parameters Parameters Parameters   SKTC 2 x 20 sec; B 2 x 20 sec    LTR 10 x10 sec 10 x 5 sec    DKTC 2 x 20 secs     HEP/patient education 5 mins     SAQ  2 x 10; B    Supine march  2 x 10    LAQ  2 x 10: B    Hamstring stretch  3 x 20 sec B    Nustep  6' level 1    Heel raises  w x 10 in // bars    Lateral walking  4 x 15ft in // bar            MANUAL THERAPY: (15 minutes): Joint mobilization was utilized and necessary because of the patient's restricted joint motion and loss of articular motion.     Lateral distraction and LAT mobs both hips    [employer] PortalAccess Code: [email or web address]  URL: [email or web address]: 04/26/2021Prepared by: La Zaragoza-Leonorxhaily   Supine Lower Trunk Rotation - 1 x daily - 7 x weekly - 1 sets - 10 reps - 10 hold   Hooklying Single Knee to Chest - 1 x daily - 7 x weekly - 3 reps - 20 hold   Supine Double Knee to Chest - 1 x daily - 7 x weekly - 3 reps - 20 hold   Supine Pelvic Tilt - 1 x daily - 7 x weekly - 1 sets - 5 reps - 5 hold    Treatment/Session Summary:    · Response to Treatment:  Patient did well with exercise activity today without increase in symptoms.   · Communication/Consultation:  None today  · Equipment provided today:  None today  · Recommendations/Intent for next treatment session: Next visit will focus on strengthening, gait training, balance and proprioceptive training    Total Treatment Billable Duration:  45 minutes (30 minutes there ex, 15 minutes manual)  PT Patient Time In/Time Out  Time In: 1645  Time Out: [address], PT    Future Appointments   Date Time Provider Tiago Roberson   5/3/2021 11:00 AM Oly Powell, PT [facility]   5/6/2021 11:15 AM La Shah, PT [facility]   5/10/2021 11:00 AM La Shah, PT [facility]   5/12/2021 11:00 AM La Shah, PT [facility]   5/12/2021  1:30 PM VSA ULTRASOUND 2 [facility]   5/12/2021  2:00 PM Bryant Benavides, SAMIA [facility]   5/17/2021 11:00 AM La Shah, PT [facility]   5/19/2021 11:00 AM La Shah, PT [facility]   5/24/2021 11:00 AM La Shah, PT [facility]   5/26/2021 11:00 AM La Shah, PT [facility]   6/1/2021 11:00 AM La Shah, PT [facility]   6/3/2021 11:15 AM La Shah PT [facility]   6/11/2021  1:00 PM REMOTE PACER 34 GVL SSA [facility]   6/18/2021 11:45 AM Brennan Aguila MD [address]

## 2021-05-03 ENCOUNTER — HOSPITAL ENCOUNTER (OUTPATIENT)
Dept: PHYSICAL THERAPY | Age: 86
Discharge: HOME | End: 2021-05-03
Payer: MEDICARE

## 2021-05-03 PROCEDURE — 97110 THERAPEUTIC EXERCISES: CPT

## 2021-05-03 NOTE — PROGRESS NOTES
Damon Jimenez  : 1933  [place]  Secondary: [address] [facility]  [address], [address], [place] [address]  [phone]   [phone]      OUTPATIENT PHYSICAL THERAPY: Daily Treatment Note 5/3/2021  Visit Count:  3    ICD-10: Treatment Diagnosis: M62.81 Generalized weakness  R26.2 Difficulty walking  Precautions/Allergies:   Patient has no known allergies. TREATMENT PLAN:  Effective Dates: 2021 TO 2021 (60 days). Frequency/Duration: 2 times a week for 60 Day(s) MEDICAL/REFERRING DIAGNOSIS:  General weakness [R53.1]   DATE OF ONSET:   REFERRING PHYSICIAN: Liseth Hoover MD MD Orders: Evaluate and treat  Return MD Appointment: TBD       Pre-treatment Symptoms/Complaints:   Mr. Cheyenne Montgomery reports being pain free today  Pain: Initial: Pain Intensity 1: 0 /10 Post Session:  2/10   Medications Last Reviewed:  5/3/2021  Updated Objective Findings:  None Today  TREATMENT:     THERAPEUTIC EXERCISE: (55 minutes):  Exercises per grid below to improve mobility, strength and balance. Required minimal verbal and tactile cues to promote proper body alignment and promote proper body posture. Progressed resistance, range, repetitions and complexity of movement as indicated. Date:  21 Date:  21 Date:  5/3/21   Activity/Exercise Parameters Parameters Parameters   SKTC 2 x 20 sec; B 2 x 20 sec 2 x 10 secs   LTR 10 x10 sec 10 x 5 sec 10 x 5   DKTC 2 x 20 secs     HEP/patient education 5 mins     SAQ  2 x 10; B 2 x 10 2#; b   Supine march  2 x 10    LAQ  2 x 10: B 2 x 10 2#;  B   Hamstring stretch  3 x 20 sec B    Nustep  6' level 1 10' for endurance   Heel raises  w x 10 in // bars 2 x 10 on aiex   Lateral walking  4 x 15ft in // bar 6 x 15 YTB in // bars   SLS on airex pad   Hip abd 2 x 10 B   Tandem walking    4 x 15 ft    High march walk   4 x 15 ft   Retro walking   4 x 15 ft   Calf stretch   2 x 20 sec B Deep breathing   3' with cues manually     MANUAL THERAPY: (0 minutes): Joint mobilization was utilized and necessary because of the patient's restricted joint motion and loss of articular motion. (not performed)  Lateral distraction and LAT mobs both hips    [employer] PortalAccess Code: [email or web address]  URL: [email or web address]: 04/26/2021Prepared by: La Washburnxercaugustus   Supine Lower Trunk Rotation - 1 x daily - 7 x weekly - 1 sets - 10 reps - 10 hold   Hooklying Single Knee to Chest - 1 x daily - 7 x weekly - 3 reps - 20 hold   Supine Double Knee to Chest - 1 x daily - 7 x weekly - 3 reps - 20 hold   Supine Pelvic Tilt - 1 x daily - 7 x weekly - 1 sets - 5 reps - 5 hold    Treatment/Session Summary:    · Response to Treatment:  Patient did very well with increased exercise activity. Did require a brief rest period towards the end of the session balance  work. Good O2 sats : 97- 98% throughout .   · Communication/Consultation:  None today  · Equipment provided today:  None today  · Recommendations/Intent for next treatment session: Next visit will focus on strengthening, gait training, balance and proprioceptive training    Total Treatment Billable Duration:  55 minutes (55 minutes there ex)  PT Patient Time In/Time Out  Time In: 1100  Time Out: [address] [facility],     Future Appointments   Date Time Provider Tiago Roberson   5/6/2021 11:15 AM Lianne Shah, PT [facility]   5/10/2021 11:00 AM La Shah, PT [facility]   5/12/2021 11:00 AM La Shah, PT [facility]   5/12/2021  1:30 PM VSA ULTRASOUND 2 [facility]   5/12/2021  2:00 PM Malcolm Benavides NP [facility]   5/17/2021 11:00 AM La Shah, PT [facility]   5/19/2021 11:00 AM La Shah, PT [facility]   5/24/2021 11:00 AM La Shah, PT [facility]   5/26/2021 11:00 AM Johnny De Leon, [facility]   6/1/2021 11:00 AM La Shah, PT [facility]   6/3/2021 11:15 AM [facility]   6/11/2021  1:00 PM REMOTE PACER 34 GVL [facility]   6/18/2021 11:45 AM Raymundo Warren, Nando Cortez MD [facility]

## 2021-05-05 ENCOUNTER — APPOINTMENT (OUTPATIENT)
Dept: PHYSICAL THERAPY | Age: 86
End: 2021-05-05
Payer: MEDICARE

## 2021-05-06 ENCOUNTER — HOSPITAL ENCOUNTER (OUTPATIENT)
Dept: PHYSICAL THERAPY | Age: 86
Discharge: HOME | End: 2021-05-06
Payer: MEDICARE

## 2021-05-06 NOTE — PROGRESS NOTES
Quinn Castillo  : 1933  [place]  Secondary: [address] [facility]  [address], [address], [place] [address]  [phone]   [phone]      OUTPATIENT DAILY NOTE    NAME/AGE/GENDER: Quinn Castillo is a 80 y.o. male. DATE: 2021    Mr. Gary Loera CANCELED for today's appointment due to contractors still at his home; unable to leave.     Scar Lenz, PT   Future Appointments   Date Time Provider Tiago Roberson   2021 11:15 AM Nael Pu, PT [facility]   5/10/2021 11:00 AM Desdune-Jai, Cherylann C, PT [facility]   2021 11:00 AM Desdune-Jai, Cherylann C, PT [facility]   2021  1:30 PM VSA ULTRASOUND 2 [facility]   2021  2:00 PM Standard, Kimberly Foster, SAMIA [facility]   2021 11:00 AM Desdune-Jai, Cherylann C, PT [facility]   2021 11:00 AM Desdune-Jai, Cherylann C, PT [facility]   2021 11:00 AM Desdune-Jai, Cherylann C, PT [facility]   2021 11:00 AM Desdune-Jai, Aideylann C, PT [facility]   2021 11:00 AM Desdune-Jai, Cherylann C, PT [facility]   6/3/2021 11:15 AM Desdune-Jai, Cherylann C, PT [facility]   2021  1:00 PM REMOTE PACER 34 GVL [facility]   2021 11:45 AM Priya Lion MD [facility]

## 2021-05-10 ENCOUNTER — HOSPITAL ENCOUNTER (OUTPATIENT)
Dept: PHYSICAL THERAPY | Age: 86
Discharge: HOME | End: 2021-05-10
Payer: MEDICARE

## 2021-05-10 PROCEDURE — 97110 THERAPEUTIC EXERCISES: CPT

## 2021-05-10 NOTE — PROGRESS NOTES
Tin Simeon  : 1933  [place]  Secondary: [address] [facility]  [address], [address], [place]  [phone]   [phone]      OUTPATIENT PHYSICAL THERAPY: Daily Treatment Note 5/10/2021  Visit Count:  4    ICD-10: Treatment Diagnosis: M62.81 Generalized weakness  R26.2 Difficulty walking  Precautions/Allergies:   Patient has no known allergies. TREATMENT PLAN:  Effective Dates: 2021 TO 2021 (60 days). Frequency/Duration: 2 times a week for 60 Day(s) MEDICAL/REFERRING DIAGNOSIS:  General weakness [R53.1]   DATE OF ONSET:   REFERRING PHYSICIAN: Carlos Wyatt MD MD Orders: Evaluate and treat  Return MD Appointment: TBD       Pre-treatment Symptoms/Complaints:   Mr. Daniel Julio reports having more pain in the AM in his low back and legs, but better with movement  Pain: Initial: Pain Intensity 1: 1 /10 Post Session:  2/10 fatigue   Medications Last Reviewed:  5/10/2021  Updated Objective Findings:  None Today  TREATMENT:     THERAPEUTIC EXERCISE: (55 minutes):  Exercises per grid below to improve mobility, strength and balance. Required minimal verbal and tactile cues to promote proper body alignment and promote proper body posture. Progressed resistance, range, repetitions and complexity of movement as indicated. Date:  21 Date:  21 Date:  5/3/21 Date:  5/10/21   Activity/Exercise Parameters Parameters Parameters    SKTC 2 x 20 sec; B 2 x 20 sec 2 x 10 secs 2 x 10 sec   LTR 10 x10 sec 10 x 5 sec 10 x 5 10 x 5 sec   DKTC 2 x 20 secs   10 x 5 secs with ball   HEP/patient education 5 mins      SAQ  2 x 10; B 2 x 10 2#; b 2 x 10 2#; B   Supine march  2 x 10  2 x 10   LAQ  2 x 10: B 2 x 10 2#;  B    Hamstring stretch  3 x 20 sec B  3 x 20 sec B   Nustep  6' level 1 10' for endurance 10' for endurance   Heel raises  w x 10 in // bars 2 x 10 on aiex    Lateral walking  4 x 15ft in // bar 6 x 15 YTB in // bars 2 x 20ft YTB and hand hold   SLS on airex pad   Hip abd 2 x 10 B    Tandem walking    4 x 15 ft     High march walk   4 x 15 ft    Retro walking   4 x 15 ft    Calf stretch   2 x 20 sec B    Deep breathing   3' with cues manually 3' cues for sternal rise   SLR    1 x 10 1#; B  1 x 10 0#; B   Bridge    2 x 5   Sit to stand squat    2 x 5     MANUAL THERAPY: (0 minutes): Joint mobilization was utilized and necessary because of the patient's restricted joint motion and loss of articular motion. (not performed)  Lateral distraction and LAT mobs both hips    Heart Health PortalAccess Code: [email or web address]  URL: [email or web address]: 04/26/2021Prepared by: La Zaargoza-Rubens   Supine Lower Trunk Rotation - 1 x daily - 7 x weekly - 1 sets - 10 reps - 10 hold   Hooklying Single Knee to Chest - 1 x daily - 7 x weekly - 3 reps - 20 hold   Supine Double Knee to Chest - 1 x daily - 7 x weekly - 3 reps - 20 hold   Supine Pelvic Tilt - 1 x daily - 7 x weekly - 1 sets - 5 reps - 5 hold    Treatment/Session Summary:    · Response to Treatment:  Tolerated treatmetn well and increased intensity of LE strengthening activity.   · Communication/Consultation:  None today  · Equipment provided today:  None today  · Recommendations/Intent for next treatment session: Next visit will focus on strengthening, gait training, balance and proprioceptive training    Total Treatment Billable Duration:  55 minutes (55 minutes there ex)  PT Patient Time In/Time Out  Time In: 1100  Time Out: 404 [facility], PT    Future Appointments   Date Time Provider Tiago Roberson   5/12/2021 11:00 AM ARASH Dove   5/12/2021  1:30 PM VSA ULTRASOUND 2 [facility]   5/12/2021  2:00 PM Bryant Benavides NP SSA [facility]   5/17/2021 11:00 AM La Shah, PT [facility]   5/19/2021 11:00 AM La Shah, PT [facility] 5/24/2021 11:00 AM La Shah, PT [facility]   5/26/2021 11:00 AM La Shah, PT [facility]   6/1/2021 11:00 AM La Shah, PT [facility]   6/3/2021 11:15 AM La Shah, PT [facility]   6/11/2021  1:00 PM REMOTE PACER 34 [facility]   6/18/2021 11:45 AM Roxanne Reynolds, Teri Monterroso MD [facility]

## 2021-05-12 ENCOUNTER — HOSPITAL ENCOUNTER (OUTPATIENT)
Dept: PHYSICAL THERAPY | Age: 86
Discharge: HOME | End: 2021-05-12
Payer: MEDICARE

## 2021-05-12 PROCEDURE — 97110 THERAPEUTIC EXERCISES: CPT

## 2021-05-12 NOTE — PROGRESS NOTES
Alber Ibrahim  : 1933  [place]  Secondary: [address] [facility]  [address], [address], [place] [address]  [phone]   [phone]      OUTPATIENT PHYSICAL THERAPY: Daily Treatment Note 2021  Visit Count:  5    ICD-10: Treatment Diagnosis: M62.81 Generalized weakness  R26.2 Difficulty walking  Precautions/Allergies:   Patient has no known allergies. TREATMENT PLAN:  Effective Dates: 2021 TO 2021 (60 days). Frequency/Duration: 2 times a week for 60 Day(s) MEDICAL/REFERRING DIAGNOSIS:  General weakness [R53.1]   DATE OF ONSET:   REFERRING PHYSICIAN: Joyce Blair MD MD Orders: Evaluate and treat  Return MD Appointment: TBD       Pre-treatment Symptoms/Complaints:   Mr. Lima Martinez reports being fatigued today  Pain: Initial: Pain Intensity 1: 2 /10 Post Session:  2/10 fatigue   Medications Last Reviewed:  2021  Updated Objective Findings:  None Today  TREATMENT:     THERAPEUTIC EXERCISE: (45 minutes):  Exercises per grid below to improve mobility, strength and balance. Required minimal verbal and tactile cues to promote proper body alignment and promote proper body posture. Progressed range, repetitions and complexity of movement as indicated. Date:  21 Date:  21 Date:  5/3/21 Date:  5/10/21 Date:  21   Activity/Exercise Parameters Parameters Parameters     SKTC 2 x 20 sec; B 2 x 20 sec 2 x 10 secs 2 x 10 sec 2 x 20 sec B   LTR 10 x10 sec 10 x 5 sec 10 x 5 10 x 5 sec 10x   DKTC 2 x 20 secs   10 x 5 secs with ball 10 x 5 secs with ball   HEP/patient education 5 mins       SAQ  2 x 10; B 2 x 10 2#; b 2 x 10 2#; B 1 x 10   Supine march  2 x 10  2 x 10 2 x 10   LAQ  2 x 10: B 2 x 10 2#;  B  2 x 10   Hamstring stretch  3 x 20 sec B  3 x 20 sec B 3 x 20 sec B   Nustep  6' level 1 10' for endurance 10' for endurance 10' for endurance   Heel raises  w x 10 in // bars 2 x 10 on aiex 2 x 10   Lateral walking  4 x 15ft in // bar 6 x 15 YTB in // bars 2 x 20ft YTB and hand hold    SLS on airex pad   Hip abd 2 x 10 B  1 x 10 B   Tandem walking    4 x 15 ft      High march walk   4 x 15 ft     Retro walking   4 x 15 ft     Calf stretch   2 x 20 sec B     Deep breathing   3' with cues manually 3' cues for sternal rise    SLR    1 x 10 1#; B  1 x 10 0#; B    Bridge    2 x 5    Sit to stand squat    2 x 5    Hip adduction with ball squeeze     2 x 10    Hip abduction      2 x 10 RTB     MANUAL THERAPY: (0 minutes): Joint mobilization was utilized and necessary because of the patient's restricted joint motion and loss of articular motion. (not performed)  Lateral distraction and LAT mobs both hips    SnapHealth PortalAccess Code: [email or web address]  URL: [email or web address]: 04/26/2021Prepared by: La Zaragoza-Leonorxhaily   Supine Lower Trunk Rotation - 1 x daily - 7 x weekly - 1 sets - 10 reps - 10 hold   Hooklying Single Knee to Chest - 1 x daily - 7 x weekly - 3 reps - 20 hold   Supine Double Knee to Chest - 1 x daily - 7 x weekly - 3 reps - 20 hold   Supine Pelvic Tilt - 1 x daily - 7 x weekly - 1 sets - 5 reps - 5 hold    Treatment/Session Summary:    · Response to Treatment:  Patient tolerated treatment well, but limited by fatigue.   · Communication/Consultation:  None today  · Equipment provided today:  None today  · Recommendations/Intent for next treatment session: Next visit will focus on strengthening, gait training, balance and proprioceptive training    Total Treatment Billable Duration:  45 minutes (45 minutes there ex)  PT Patient Time In/Time Out  Time In: 1100  Time Out: Anabella Burroughs, PT    Future Appointments   Date Time Provider Tiago Roberson   5/17/2021 11:00 AM Nael Lewis, PT [facility]   5/19/2021 11:00 AM La Shah PT [facility]   5/24/2021 11:00 AM La Shah PT [facility]   5/26/2021 11:00 AM La Shah, PT [facility]   6/1/2021 11:00 AM La Shah, PT [facility]   6/3/2021 11:15 AM La Shah, PT [facility]   6/11/2021  1:00 PM REMOTE PACER 34 [facility]   6/18/2021 11:45 AM Remedios Sharma MD [facility]   11/15/2021  1:00 PM VSA ULTRASOUND 1 [facility] BS [facility]   11/15/2021  1:30 PM Nelson Benavides NP [facility]

## 2021-05-17 ENCOUNTER — HOSPITAL ENCOUNTER (OUTPATIENT)
Dept: PHYSICAL THERAPY | Age: 86
Discharge: HOME | End: 2021-05-17
Payer: MEDICARE

## 2021-05-17 PROCEDURE — 97110 THERAPEUTIC EXERCISES: CPT

## 2021-05-17 NOTE — PROGRESS NOTES
Berry Hearn  : 1933  [place]  Secondary: [address] [facility]  [address], [address], [place] [address]  [phone]   [phone]      OUTPATIENT PHYSICAL THERAPY: Daily Treatment Note 2021  Visit Count:  6    ICD-10: Treatment Diagnosis: M62.81 Generalized weakness  R26.2 Difficulty walking  Precautions/Allergies:   Patient has no known allergies. TREATMENT PLAN:  Effective Dates: 2021 TO 2021 (60 days). Frequency/Duration: 2 times a week for 60 Day(s) MEDICAL/REFERRING DIAGNOSIS:  General weakness [R53.1]   DATE OF ONSET:   REFERRING PHYSICIAN: Mac Palomo MD MD Orders: Evaluate and treat  Return MD Appointment: TBD       Pre-treatment Symptoms/Complaints:   Mr. Lázaro Gonzalez reports some complaints of low back soreness, but reports he walked over 3/4 mile yesterday  Pain: Initial: Pain Intensity 1: 2 /10 Post Session:  2/10 fatigue   Medications Last Reviewed:  2021  Updated Objective Findings:  None Today  TREATMENT:     THERAPEUTIC EXERCISE: (45 minutes):  Exercises per grid below to improve mobility, strength and balance. Required minimal verbal and tactile cues to promote proper body alignment and promote proper body posture. Progressed range, repetitions and complexity of movement as indicated. Date:  21 Date:  21 Date:  5/3/21 Date:  5/10/21 Date:  21 Date:  21   Activity/Exercise Parameters Parameters Parameters      SKTC 2 x 20 sec; B 2 x 20 sec 2 x 10 secs 2 x 10 sec 2 x 20 sec B 2 x 20 sec   LTR 10 x10 sec 10 x 5 sec 10 x 5 10 x 5 sec 10x    DKTC 2 x 20 secs   10 x 5 secs with ball 10 x 5 secs with ball 5 x 10 secs with ball   HEP/patient education 5 mins        SAQ  2 x 10; B 2 x 10 2#; b 2 x 10 2#; B 1 x 10    Supine march  2 x 10  2 x 10 2 x 10    LAQ  2 x 10: B 2 x 10 2#;  B  2 x 10    Hamstring stretch  3 x 20 sec B  3 x 20 sec B 3 x 20 sec B Nustep  6' level 1 10' for endurance 10' for endurance 10' for endurance 10'  L2   Heel raises  w x 10 in // bars 2 x 10 on aiex  2 x 10 2 x 10 on airex   Lateral walking  4 x 15ft in // bar 6 x 15 YTB in // bars 2 x 20ft YTB and hand hold  6 x 15 YTB in // bars   SLS on airex pad   Hip abd 2 x 10 B  1 x 10 B 1 x 10 B   Tandem walking    4 x 15 ft       High march walk   4 x 15 ft   4 x 15 ft   Retro walking   4 x 15 ft      Calf stretch   2 x 20 sec B      Deep breathing   3' with cues manually 3' cues for sternal rise     SLR    1 x 10 1#; B  1 x 10 0#; B     Bridge    2 x 5     Sit to stand squat    2 x 5     Hip adduction with ball squeeze     2 x 10     Hip abduction      2 x 10 RTB    Step ups      2 x 10 6 ' step   Fitter      A/P, M/L tipping and dynamic balance x 6'     MANUAL THERAPY: (0 minutes): Joint mobilization was utilized and necessary because of the patient's restricted joint motion and loss of articular motion. (not performed)  Lateral distraction and LAT mobs both hips    Mama PortalAccess Code: [email or web address]  URL: [email or web address]: 04/26/2021Prepared by: La Zaragoza-Leonorxhaily   Supine Lower Trunk Rotation - 1 x daily - 7 x weekly - 1 sets - 10 reps - 10 hold   Hooklying Single Knee to Chest - 1 x daily - 7 x weekly - 3 reps - 20 hold   Supine Double Knee to Chest - 1 x daily - 7 x weekly - 3 reps - 20 hold   Supine Pelvic Tilt - 1 x daily - 7 x weekly - 1 sets - 5 reps - 5 hold    Treatment/Session Summary:    · Response to Treatment:  Patient toelrated treatment well demonstating good balance reactions with fitter activities.   Required several rest periods due to COPD  and breathing issues which resolved quickly with rest.  · Communication/Consultation:  None today  · Equipment provided today:  None today  · Recommendations/Intent for next treatment session: Next visit will focus on strengthening, gait training, balance and proprioceptive training    Total Treatment Billable Duration:  45 minutes (45 minutes there ex)  PT Patient Time In/Time Out  Time In: 1100  Time Out: 45 [facility], PT    Future Appointments   Date Time Provider Tiago Karol   5/19/2021 11:00 AM Alice Shah, PT [facility]   5/24/2021 11:00 AM La Shah, PT [facility]   5/26/2021 11:00 AM La Shah, PT [facility]   6/1/2021 11:00 AM La Shah, PT [facility]   6/3/2021 11:15 AM La Shah, PT [facility]   6/11/2021  1:00 PM [facility]   6/18/2021 11:45 AM Mitali Baxter MD [facility]   11/15/2021  1:00 PM VSA ULTRASOUND 1 [facility]   11/15/2021  1:30 PM Cheo Benavides NP [facility]

## 2021-05-19 ENCOUNTER — HOSPITAL ENCOUNTER (OUTPATIENT)
Dept: PHYSICAL THERAPY | Age: 86
Discharge: HOME | End: 2021-05-19
Payer: MEDICARE

## 2021-05-19 PROCEDURE — 97140 MANUAL THERAPY 1/> REGIONS: CPT

## 2021-05-19 PROCEDURE — 97110 THERAPEUTIC EXERCISES: CPT

## 2021-05-19 NOTE — PROGRESS NOTES
Ester Sheth  : 1933  [place]  Secondary: 310 [facility]  [address], [address], [place] [address]  [phone]   [phone]      OUTPATIENT PHYSICAL THERAPY: Daily Treatment Note 2021  Visit Count:  7    ICD-10: Treatment Diagnosis: M62.81 Generalized weakness  R26.2 Difficulty walking  Precautions/Allergies:   Patient has no known allergies. TREATMENT PLAN:  Effective Dates: 2021 TO 2021 (60 days). Frequency/Duration: 2 times a week for 60 Day(s) MEDICAL/REFERRING DIAGNOSIS:  General weakness [R53.1]   DATE OF ONSET:   REFERRING PHYSICIAN: Vinayak Wade MD MD Orders: Evaluate and treat  Return MD Appointment: TBD       Pre-treatment Symptoms/Complaints:   Mr. Precious Yanez reports feeling no better or worse, just limited by his COPD, though feels he is doing more  Pain: Initial: Pain Intensity 1: 2 /10 Post Session:  2/10 fatigue   Medications Last Reviewed:  2021  Updated Objective Findings:  None Today  TREATMENT:     THERAPEUTIC EXERCISE: (30 minutes):  Exercises per grid below to improve mobility, strength and balance. Required minimal verbal and tactile cues to promote proper body alignment and promote proper body posture. Progressed range, repetitions and complexity of movement as indicated. Date:  21 Date:  21 Date:  5/3/21 Date:  5/10/21 Date:  21 Date:  21 Date:  21   Activity/Exercise Parameters Parameters Parameters       SKTC 2 x 20 sec; B 2 x 20 sec 2 x 10 secs 2 x 10 sec 2 x 20 sec B 2 x 20 sec    LTR 10 x10 sec 10 x 5 sec 10 x 5 10 x 5 sec 10x  10x   DKTC 2 x 20 secs   10 x 5 secs with ball 10 x 5 secs with ball 5 x 10 secs with ball    HEP/patient education 5 mins         SAQ  2 x 10; B 2 x 10 2#; b 2 x 10 2#; B 1 x 10     Supine march  2 x 10  2 x 10 2 x 10     LAQ  2 x 10: B 2 x 10 2#;  B  2 x 10     Hamstring stretch  3 x 20 sec B  3 x 20 sec B 3 x 20 sec B     Nustep  6' level 1 10' for endurance 10' for endurance 10' for endurance 10'  L2    Heel raises  w x 10 in // bars 2 x 10 on aiex  2 x 10 2 x 10 on airex 2 x 10 on airex   Lateral walking  4 x 15ft in // bar 6 x 15 YTB in // bars 2 x 20ft YTB and hand hold  6 x 15 YTB in // bars 6 x 15 RTB in // bars   SLS on airex pad   Hip abd 2 x 10 B  1 x 10 B 1 x 10 B 2 x 10; B   Tandem walking    4 x 15 ft        High march walk   4 x 15 ft   4 x 15 ft 2 x 15ft YTB   Retro walking   4 x 15 ft       Calf stretch   2 x 20 sec B    3 x 20 sec    Deep breathing   3' with cues manually 3' cues for sternal rise   2 x 10 with cues for sternal lift   SLR    1 x 10 1#; B  1 x 10 0#; B      Bridge    2 x 5      Sit to stand squat    2 x 5      Hip adduction with ball squeeze     2 x 10      Hip abduction      2 x 10 RTB     Step ups      2 x 10 6 ' step 2 x 10 6' step   Fitter      A/P, M/L tipping and dynamic balance x 6' A/P, M/L tipping and dynamic balance x 8'   Seated lumbar flexion stretch       10x center w ball  5x oblique;B      MANUAL THERAPY: (15 minutes): Joint mobilization and Soft tissue mobilization was utilized and necessary because of the patient's restricted joint motion and loss of articular motion. STM/MFR lumbar paraspinals  SNAG's to mid to lower thoracic spine    PLDT PortalAccess Code: [email or web address]  URL: [email or web address]: 04/26/2021Prepared by: La Zaragoza-Leonorxhaily   Supine Lower Trunk Rotation - 1 x daily - 7 x weekly - 1 sets - 10 reps - 10 hold   Hooklying Single Knee to Chest - 1 x daily - 7 x weekly - 3 reps - 20 hold   Supine Double Knee to Chest - 1 x daily - 7 x weekly - 3 reps - 20 hold   Supine Pelvic Tilt - 1 x daily - 7 x weekly - 1 sets - 5 reps - 5 hold    Treatment/Session Summary:    · Response to Treatment:  Patient reported improvement in soft issue mobility post manual work and spinal stretches.   · Communication/Consultation:  None today  · Equipment provided today:  None today  · Recommendations/Intent for next treatment session: Next visit will focus on strengthening, gait training, balance and proprioceptive training    Total Treatment Billable Duration:  45 minutes (30 minutes there ex, 15 minutes manual)  PT Patient Time In/Time Out  Time In: 1100  Time Out: Anabella 417, PT    Future Appointments   Date Time Provider Tiago Karol   5/24/2021 11:00 AM [facility]   5/26/2021 11:00 AM La Shah[facility]   6/1/2021 11:00 AM La Shah[facility]   6/3/2021 11:15 AM La Shah, [facility]   6/11/2021  1:00 PM REMOTE PACER 34 GVL [facility]   6/18/2021 11:45 AM MD AMINATA Shearer [facility]   11/15/2021  1:00 PM VSA ULTRASOUND 1 [facility]   11/15/2021  1:30 PM Nanette Benavides NP [facility]

## 2021-05-24 ENCOUNTER — HOSPITAL ENCOUNTER (OUTPATIENT)
Dept: PHYSICAL THERAPY | Age: 86
Discharge: HOME | End: 2021-05-24
Payer: MEDICARE

## 2021-05-24 PROCEDURE — 97140 MANUAL THERAPY 1/> REGIONS: CPT

## 2021-05-24 PROCEDURE — 97110 THERAPEUTIC EXERCISES: CPT

## 2021-05-24 NOTE — PROGRESS NOTES
Hansel Talamantes  : 1933  [place]  Secondary: 310 [facility]  [address], [address], [place] [address]  [phone]   [phone]      OUTPATIENT PHYSICAL THERAPY: Daily Treatment Note 2021  Visit Count:  8    ICD-10: Treatment Diagnosis: M62.81 Generalized weakness  R26.2 Difficulty walking  Precautions/Allergies:   Patient has no known allergies. TREATMENT PLAN:  Effective Dates: 2021 TO 2021 (60 days). Frequency/Duration: 2 times a week for 60 Day(s) MEDICAL/REFERRING DIAGNOSIS:  General weakness [R53.1]   DATE OF ONSET:   REFERRING PHYSICIAN: Suzanne Frank MD MD Orders: Evaluate and treat  Return MD Appointment: TBD       Pre-treatment Symptoms/Complaints:   Mr. Judie Rutherford reports he was doing some floor work on his hands and kness over the weekend and woke up with a lot of osorenss this morning  Pain: Initial: Pain Intensity 1: 4 10 Post Session:  2/10    Medications Last Reviewed:  2021  Updated Objective Findings:  None Today  TREATMENT:     THERAPEUTIC EXERCISE: (30 minutes):  Exercises per grid below to improve mobility, strength and balance. Required minimal verbal and tactile cues to promote proper body alignment and promote proper body posture. Progressed range, repetitions and complexity of movement as indicated. Date:  21 Date:  5/3/21 Date:  5/10/21 Date:  21 Date:  21 Date:  21 Date:  21   Activity/Exercise Parameters Parameters        SKTC 2 x 20 sec 2 x 10 secs 2 x 10 sec 2 x 20 sec B 2 x 20 sec  3x 20 sec; B   LTR 10 x 5 sec 10 x 5 10 x 5 sec 10x  10x 10x   DKTC   10 x 5 secs with ball 10 x 5 secs with ball 5 x 10 secs with ball  10 x with ball   HEP/patient education          SAQ 2 x 10; B 2 x 10 2#; b 2 x 10 2#; B 1 x 10      Supine march 2 x 10  2 x 10 2 x 10      LAQ 2 x 10: B 2 x 10 2#;  B  2 x 10      Hamstring stretch 3 x 20 sec B  3 x 20 sec B 3 x 20 sec B   3 x 20 sec; B   Nustep 6' level 1 10' for endurance 10' for endurance 10' for endurance 10'  L2  10' L2   Heel raises w x 10 in // bars 2 x 10 on aiex  2 x 10 2 x 10 on airex 2 x 10 on airex 2 x 10 on airex   Lateral walking 4 x 15ft in // bar 6 x 15 YTB in // bars 2 x 20ft YTB and hand hold  6 x 15 YTB in // bars 6 x 15 RTB in // bars 6 x 15 RTB in // bars   SLS on airex pad  Hip abd 2 x 10 B  1 x 10 B 1 x 10 B 2 x 10; B Hip abd 2 x 10   Tandem walking   4 x 15 ft         High march walk  4 x 15 ft   4 x 15 ft 2 x 15ft YTB    Retro walking  4 x 15 ft        Calf stretch  2 x 20 sec B    3 x 20 sec     Deep breathing  3' with cues manually 3' cues for sternal rise   2 x 10 with cues for sternal lift    SLR   1 x 10 1#; B  1 x 10 0#; B       Bridge   2 x 5       Sit to stand squat   2 x 5       Hip adduction with ball squeeze    2 x 10       Hip abduction     2 x 10 RTB      Step ups     2 x 10 6 ' step 2 x 10 6' step 2 x 10 6'\" step   Fitter     A/P, M/L tipping and dynamic balance x 6' A/P, M/L tipping and dynamic balance x 8'    Seated lumbar flexion stretch      10x center w ball  5x oblique;B  10x center w ball  5x oblique;B      MANUAL THERAPY: (15 minutes): Joint mobilization and Soft tissue mobilization was utilized and necessary because of the patient's restricted joint motion and loss of articular motion. STM/MFR lumbar paraspinals  LAT to BLE    MOIST HOT PACK: (10 minutes) for muscle relaxation and analgesia (no charge)    [employer] PortalAccess Code: [email or web address]  URL: [email or web address]: 04/26/2021Prepared by: La Zaragoza-Leonorxhaily   Supine Lower Trunk Rotation - 1 x daily - 7 x weekly - 1 sets - 10 reps - 10 hold   Hooklying Single Knee to Chest - 1 x daily - 7 x weekly - 3 reps - 20 hold   Supine Double Knee to Chest - 1 x daily - 7 x weekly - 3 reps - 20 hold   Supine Pelvic Tilt - 1 x daily - 7 x weekly - 1 sets - 5 reps - 5 hold    Treatment/Session Summary:    · Response to Treatment:  Patient tolerated treatment well despite initial complaints of stiffnes and soreness post weekend  activtity,with improvement in symptoms post treatment.   · Communication/Consultation:  None today  · Equipment provided today:  None today  · Recommendations/Intent for next treatment session: Next visit will focus on strengthening, gait training, balance and proprioceptive training    Total Treatment Billable Duration:  45 minutes (30 minutes there ex, 15 minutes manual)  PT Patient Time In/Time Out  Time In: 1100  Time Out: 404 [facility],     Future Appointments   Date Time Provider Tiago Roberson   5/26/2021 11:00 AM [facility]   6/1/2021 11:00 AM La Shah PT [facility]   6/3/2021 11:15 AM La Shah, PT [facility]   6/11/2021  1:00 PM REMOTE PACER 34 GVL [facility]   6/18/2021 11:45 AM MD AMINATA Acevedo [facility]   11/15/2021  1:00 PM VSA ULTRASOUND 1 [facility]   11/15/2021  1:30 PM Linda Benavides NP [facility]

## 2021-05-26 ENCOUNTER — HOSPITAL ENCOUNTER (OUTPATIENT)
Dept: PHYSICAL THERAPY | Age: 86
Discharge: HOME | End: 2021-05-26
Payer: MEDICARE

## 2021-05-26 PROCEDURE — 97110 THERAPEUTIC EXERCISES: CPT

## 2021-05-26 PROCEDURE — 97140 MANUAL THERAPY 1/> REGIONS: CPT

## 2021-05-26 NOTE — PROGRESS NOTES
Berry Hearn  : 1933  [place]  Secondary: [address] [facility]  [address], [address], [place]  [phone]   [phone]      OUTPATIENT PHYSICAL THERAPY: Daily Treatment Note 2021  Visit Count:  9    ICD-10: Treatment Diagnosis: M62.81 Generalized weakness  R26.2 Difficulty walking  Precautions/Allergies:   Patient has no known allergies. TREATMENT PLAN:  Effective Dates: 2021 TO 2021 (60 days). Frequency/Duration: 2 times a week for 60 Day(s) MEDICAL/REFERRING DIAGNOSIS:  General weakness [R53.1]   DATE OF ONSET:   REFERRING PHYSICIAN: Mac Palomo MD MD Orders: Evaluate and treat  Return MD Appointment: TBD       Pre-treatment Symptoms/Complaints:   Mr. Lázaro Gonzalez reports still having some right sided low back pain better if he stand slightly bent forward  Pain: Initial: Pain Intensity 1: 10 Post Session:  2/10    Medications Last Reviewed:  2021  Updated Objective Findings:  None Today  TREATMENT:     THERAPEUTIC EXERCISE: (30 minutes):  Exercises per grid below to improve mobility, strength and balance. Required minimal verbal and tactile cues to promote proper body alignment and promote proper body posture. Progressed range, repetitions and complexity of movement as indicated. Date:  5/3/21 Date:  5/10/21 Date:  21 Date:  21 Date:  21 Date:  21 Date:  21   Activity/Exercise Parameters         SKTC 2 x 10 secs 2 x 10 sec 2 x 20 sec B 2 x 20 sec  3x 20 sec; B 3 x 20 secs; B   LTR 10 x 5 10 x 5 sec 10x  10x 10x 10x   DKTC  10 x 5 secs with ball 10 x 5 secs with ball 5 x 10 secs with ball  10 x with ball 10x with ball   HEP/patient education          SAQ 2 x 10 2#; b 2 x 10 2#; B 1 x 10    2 x 10 1.5# ; B   Supine march  2 x 10 2 x 10       LAQ 2 x 10 2#;  B  2 x 10       Hamstring stretch  3 x 20 sec B 3 x 20 sec B   3 x 20 sec; B    Nustep 10' for endurance 10' for endurance 10' for endurance 10'  L2  10' L2 10' L2   Heel raises 2 x 10 on aiex  2 x 10 2 x 10 on airex 2 x 10 on airex 2 x 10 on airex    Lateral walking 6 x 15 YTB in // bars 2 x 20ft YTB and hand hold  6 x 15 YTB in // bars 6 x 15 RTB in // bars 6 x 15 RTB in // bars 6 x 15 RTB in // bars   SLS on airex pad Hip abd 2 x 10 B  1 x 10 B 1 x 10 B 2 x 10; B Hip abd 2 x 10    Tandem walking  4 x 15 ft          High march walk 4 x 15 ft   4 x 15 ft 2 x 15ft YTB     Retro walking 4 x 15 ft         Calf stretch 2 x 20 sec B    3 x 20 sec      Deep breathing 3' with cues manually 3' cues for sternal rise   2 x 10 with cues for sternal lift     SLR  1 x 10 1#; B  1 x 10 0#; B     2 x 10; B   Bridge  2 x 5        Sit to stand squat  2 x 5        Hip adduction with ball squeeze   2 x 10        Hip abduction    2 x 10 RTB       Step ups    2 x 10 6 ' step 2 x 10 6' step 2 x 10 6'\" step 2 x 10 6' step; B   Fitter    A/P, M/L tipping and dynamic balance x 6' A/P, M/L tipping and dynamic balance x 8'     Seated lumbar flexion stretch     10x center w ball  5x oblique;B  10x center w ball  5x oblique;B  10x center w ball  5x oblique;B      MANUAL THERAPY: (15 minutes): Joint mobilization and Soft tissue mobilization was utilized and necessary because of the patient's restricted joint motion and loss of articular motion. STM/MFR right  lumbar paraspinals    MOIST HOT PACK: (0 minutes) for muscle relaxation and analgesia (no charge)    EPS PortalAccess Code: [email or web address]  URL: [email or web address]: 04/26/2021Prepared by: La Zaragoza-Leonorxercises   Supine Lower Trunk Rotation - 1 x daily - 7 x weekly - 1 sets - 10 reps - 10 hold   Hooklying Single Knee to Chest - 1 x daily - 7 x weekly - 3 reps - 20 hold   Supine Double Knee to Chest - 1 x daily - 7 x weekly - 3 reps - 20 hold   Supine Pelvic Tilt - 1 x daily - 7 x weekly - 1 sets - 5 reps - 5 hold    Treatment/Session Summary: · Response to Treatment:  Patient tolerated treatment well, did have some aggravation of right sided back pain with standing activities.   · Communication/Consultation:  None today  · Equipment provided today:  None today  · Recommendations/Intent for next treatment session: Next visit will focus on strengthening, gait training, balance and proprioceptive training    Total Treatment Billable Duration:  45 minutes (30 minutes there ex, 15 minutes manual)  PT Patient Time In/Time Out  Time In: 1100  Time Out: [facility], PT       Future Appointments   Date Time Provider Tiago Roberson   6/1/2021 11:00 AM Karen Lozada, PT [facility]   6/3/2021 11:15 AM La Shah, PT [facility]   6/11/2021  1:00 PM REMOTE PACER 34 G[facility]   6/18/2021 11:45 AM MD AMINATA Shearer [facility]   11/15/2021  1:00 PM VSA ULTRASOUND 1 AMINATA [facility]   11/15/2021  1:30 PM Nanette Benavides NP [facility] BS [facility]

## 2021-05-27 NOTE — PROGRESS NOTES
I am accessing Mr. Peck's chart as a part of our department's internal chart auditing process. I certify that Mr. Lars Wilson is, or was, a patient in our department.   Thank you,  Nu Ramirez, PT, DPT  5/27/2021

## 2021-06-01 ENCOUNTER — HOSPITAL ENCOUNTER (OUTPATIENT)
Dept: PHYSICAL THERAPY | Age: 86
Discharge: HOME | End: 2021-06-01
Payer: MEDICARE

## 2021-06-01 PROCEDURE — 97110 THERAPEUTIC EXERCISES: CPT

## 2021-06-01 NOTE — PROGRESS NOTES
Dori Jaquez  : 1933  [place]  Secondary: [address] [facility]  [address], [place] [address]  [phone]   [phone]         OUTPATIENT PHYSICAL THERAPY:Progress Report 2021   ICD-10: Treatment Diagnosis: M62.81 Generalized weakness  R26.2 Difficulty walking  Precautions/Allergies:   Patient has no known allergies. TREATMENT PLAN:  Effective Dates: 2021 TO 2021 (60 days). Frequency/Duration: 2 times a week for 60 Day(s) MEDICAL/REFERRING DIAGNOSIS:  General weakness [R53.1]   DATE OF ONSET:   REFERRING PHYSICIAN: Wlider Wolfe MD MD Orders: Evaluate and treat  Return MD Appointment: TBD   As of 2021, Dori Jaquez has attended 10 out of  scheduled visits, with 0 cancellation(s) and 0 no shows. CURRE ASSESSMENT:  Mr. Merline Wyatt reports continued  complaints of weakness, decreased balance, pain with walking and decreased functional status which he feels is mostly due to his breathing problems   PROBLEM LIST (Impacting functional limitations):  1. Decreased Strength  2. Decreased ADL/Functional Activities  3. Decreased Ambulation Ability/Technique  4. Decreased Balance  5. Decreased Activity Tolerance  6. Decreased Flexibility/Joint Mobility  7. Decreased [place] with Home Exercise Program INTERVENTIONS PLANNED: (Treatment may consist of any combination of the following)  1. Balance Exercise  2. Gait Training  3. Home Exercise Program (HEP)  4. Neuromuscular Re-education/Strengthening  5. Therapeutic Activites  6. Therapeutic Exercise/Strengthening     GOALS: (Goals have been discussed and agreed upon with patient.)  Short-Term Functional Goals: Time Frame: 3-4 weeks  1. Independent HEP of lumbar stretches (MET)  2. Independent open chain strengthening exercises (ONGOING)  Discharge Goals: Time Frame: 6-8 weeks  1.  Increase strength by at least 1/2 grade to improve ability to rise to stand from commode (MET)  2. Improve TUG to less than 10 secs to improve steadiness with walking (ONGOING)  3. Decrease pain to 3/10 to allow for walking at least1/2 mile (ONGOING)  4. Improve LEFS to at least 65/80 to allow for return to PLOF (ONGOING)    OUTCOME MEASURE:   Tool Used: Lower Extremity Functional Scale (LEFS)  Score:  Initial:  Most Recent:  (21 )   InterprenTtation of Score: 20 questions each scored on a 5 point scale with 0 representing \"extreme difficulty or unable to perform\" and 4 representing \"no difficulty\". The lower the score, the greater the functional disability. 80/80 represents no disability. Minimal detectable change is 9 points. UPDATED OBJECTIVE FINDINGS:    ssment:          Slightly flexed posture        Gait with decreased ct  ROM:     Date:  21    Date:  61     Right Left Right Left   Lumbar flexion 45  50    Lumbar extension 0  0    Side bending 50% 50% 50% 50%   Rotation 50% 50% 75% 7%          Hip ER 45 30 50 50   Hip flex 105 105 115 120          Hamstring flexibility moderate moderate     Strength:     Date:  21    Date:  21     Right Left Right Left   Hip flexion 4 5- 4+ 5   Hip abduction 4- 4 4 4+   Knee extension 5 5 5 5   Knee flexion 4+ 4 5 5            Special Tests:          TU.57   SLS right 9 sec   Left 10  Unsteady   21 TUG 12.69    MEDICAL NECESSITY:   · Patient will require continued skilled therapeutic intervention for improving in strength, balance, gait and functional status overall. REASON FOR SERVICES/OTHER COMMENTS:  · Patient will require continued skilled therapeutic intervention to improve strength, gait, balance and restore functional status to PLOF. Total Duration: 5 minutes measurments       Rehabilitation Potential For Stated Goals: Good  Regarding Ryan Peck's therapy, I certify that the treatment plan above will be carried out by a therapist or under their direction.   Thank you for this referral,  Isaías Morrow PT     Referring Physician Signature: Carlos Wyatt, MD _______________________________ Date _____________

## 2021-06-01 NOTE — PROGRESS NOTES
Wang Cowan  : 1933  [place]  Secondary: [address] [facility] at [address]  [facility] [address], [place] [address]  [phone]   [phone]      OUTPATIENT PHYSICAL THERAPY: Daily Treatment Note 2021  Visit Count:  10    ICD-10: Treatment Diagnosis: M62.81 Generalized weakness  R26.2 Difficulty walking  Precautions/Allergies:   Patient has no known allergies. TREATMENT PLAN:  Effective Dates: 2021 TO 2021 (60 days). Frequency/Duration: 2 times a week for 60 Day(s) MEDICAL/REFERRING DIAGNOSIS:  General weakness [R53.1]   DATE OF ONSET:   REFERRING PHYSICIAN: Kayley Mills MD MD Orders: Evaluate and treat  Return MD Appointment: TBD       Pre-treatment Symptoms/Complaints:  Mr. Hilda Smalls reports continued  complaints of weakness, decreased balance, pain with walking and decreased functional status which he feels is mostly due to his breathing problems  Pain: Initial: Pain Intensity 1: 4 /10 Post Session:  2/10    Medications Last Reviewed:  2021  Updated Objective Findings:  None Today  TREATMENT:     THERAPEUTIC EXERCISE: (38 minutes):  Exercises per grid below to improve mobility and strength. Required minimal verbal cues to promote proper body alignment. Progressed range and repetitions as indicated. Date:  5/3/21 Date:  5/10/21 Date:  21 Date:  21 Date:  21 Date:  21 Date:  21 Date:  21   Activity/Exercise Parameters          SKTC 2 x 10 secs 2 x 10 sec 2 x 20 sec B 2 x 20 sec  3x 20 sec; B 3 x 20 secs; B 3 x 20 secs; B   LTR 10 x 5 10 x 5 sec 10x  10x 10x 10x 10x   DKTC  10 x 5 secs with ball 10 x 5 secs with ball 5 x 10 secs with ball  10 x with ball 10x with ball 10 x with ball   HEP/patient education        5'   SAQ 2 x 10 2#; b 2 x 10 2#; B 1 x 10    2 x 10 1.5# ;B 2 x 10     2 x 10 2 x 10        LAQ 2 x 10 2#;  B  2 x 10        Hamstring stretch  3 x 20 sec B 3 x 20 sec B   3 x 20 sec; B     Nustep 10' for endurance 10' for endurance 10' for endurance 10'  L2  10' L2 10' L2 10' L2   Heel raises 2 x 10 on aiex  2 x 10 2 x 10 on airex 2 x 10 on airex 2 x 10 on airex  2 x 10   Lateral walking 6 x 15 YTB in // bars 2 x 20ft YTB and hand hold  6 x 15 YTB in // bars 6 x 15 RTB in // bars 6 x 15 RTB in // bars 6 x 15 RTB in // bars    SLS on airex pad Hip abd 2 x 10 B  1 x 10 B 1 x 10 B 2 x 10; B Hip abd 2 x 10     Tandem walking  4 x 15 ft           High march walk 4 x 15 ft   4 x 15 ft 2 x 15ft YTB      Retro walking 4 x 15 ft          Calf stretch 2 x 20 sec B    3 x 20 sec       Deep breathing 3' with cues manually 3' cues for sternal rise   2 x 10 with cues for sternal lift      SLR  1 x 10 1#; B  1 x 10 0#; B     2 x 10; B 2 x 10: B   Bridge  2 x 5      2 x 5   Sit to stand squat  2 x 5      2 x 5   Hip adduction with ball squeeze   2 x 10         Hip abduction    2 x 10 RTB        Step ups    2 x 10 6 ' step 2 x 10 6' step 2 x 10 6'\" step 2 x 10 6' step; B    Fitter    A/P, M/L tipping and dynamic balance x 6' A/P, M/L tipping and dynamic balance x 8'      Seated lumbar flexion stretch     10x center w ball  5x oblique;B  10x center w ball  5x oblique;B  10x center w ball  5x oblique;B       MANUAL THERAPY: (0 minutes): Joint mobilization and Soft tissue mobilization was utilized and necessary because of the patient's restricted joint motion and loss of articular motion. no performed   STM/MFR right  lumbar paraspinals    MOIST HOT PACK: (0 minutes) for muscle relaxation and analgesia (no charge)    [employer] PortalAccess Code: [email or web address]  URL: [email or web address]: 04/26/2021Prepared by: La Zaragoza-MontExercaugustus   Supine Lower Trunk Rotation - 1 x daily - 7 x weekly - 1 sets - 10 reps - 10 hold   Hooklying Single Knee to Chest - 1 x daily - 7 x weekly - 3 reps - 20 hold   Supine Double Knee to Chest - 1 x daily - 7 x weekly - 3 reps - 20 hold   Supine Pelvic Tilt - 1 x daily - 7 x weekly - 1 sets - 5 reps - 5 hold    Treatment/Session Summary:    · Response to Treatment:  Patient  tolerated treatment well. Improved strength noted.   · Communication/Consultation:  Discussed POC, progress made to date  · Equipment provided today:  None today  · Recommendations/Intent for next treatment session: Next visit will focus on strengthening,, balance and proprioceptive training    Total Treatment Billable Duration:  43 minutes (38 minutes there ex, 5 minutes measurements)  PT Patient Time In/Time Out  Time In: 1100  Time Out: 45 [address] Brandee Nova, PT       Future Appointments   Date Time Provider Tiago Sheridani   6/3/2021 11:15 AM Ana Shah, PT [facility]   6/11/2021  1:00 PM REMOTE PACER 34 GVL [facility]   6/18/2021 11:45 AM Latrice Puente MD [facility]   11/15/2021  1:00 PM VSA ULTRASOUND 1 [facility] DANIELA [facility]SA   11/15/2021  1:30 PM Linda Benavides NP [facility]SA

## 2021-06-03 ENCOUNTER — HOSPITAL ENCOUNTER (OUTPATIENT)
Dept: PHYSICAL THERAPY | Age: 86
Discharge: HOME | End: 2021-06-03
Payer: MEDICARE

## 2021-06-03 PROCEDURE — 97110 THERAPEUTIC EXERCISES: CPT

## 2021-06-03 PROCEDURE — 97140 MANUAL THERAPY 1/> REGIONS: CPT

## 2021-06-03 NOTE — THERAPY DISCHARGE
Ernesto Connelly : 1933 [place] Secondary: [address] [facility] [address], [place] [address] [phone]   [phone] OUTPATIENT PHYSICAL THERAPY:Discharge Summary 6/3/2021 ICD-10: Treatment Diagnosis: M62.81 Generalized weakness R26.2 Difficulty walking Precautions/Allergies:  
Patient has no known allergies. TREATMENT PLAN: 
Effective Dates: 2021 TO 2021 (60 days). Frequency/Duration: 2 times a week for 60 Day(s) MEDICAL/REFERRING DIAGNOSIS: 
General weakness [R53.1] DATE OF ONSET:  REFERRING PHYSICIAN: Sana Panda MD MD Orders: Evaluate and treat Return MD Appointment: TBD As of 6/3/2021, Ernesto Connelly has attended 11 out of  scheduled visits, with 0 cancellation(s) and 0 no shows. CURRE ASSESSMENT:  Mr. Roseann Martines reports continued complaints of weakness, low back pain with walking and in the AM. However he feels his decreased functional status  is mostly due to his breathing problems GOALS: (Goals have been discussed and agreed upon with patient.) Short-Term Functional Goals: Time Frame: 3-4 weeks 1. Independent HEP of lumbar stretches (MET) 2. Independent open chain strengthening exercises (MET) Discharge Goals: Time Frame: 6-8 weeks 1. Increase strength by at least 1/2 grade to improve ability to rise to stand from commode (MET) 2. Improve TUG to less than 10 secs to improve steadiness with walking (NOT MET) 3. Decrease pain to 3/10 to allow for walking at least1/2 mile (NOT MET) 4. Improve LEFS to at least 65/80 to allow for return to PLOF (NOT MET) OUTCOME MEASURE:  
Tool Used: Lower Extremity Functional Scale (LEFS) Score:  Initial:  Most Recent:  (21 ) InterprenTtation of Score: 20 questions each scored on a 5 point scale with 0 representing \"extreme difficulty or unable to perform\" and 4 representing \"no difficulty\". The lower the score, the greater the functional disability. 80/80 represents no disability. Minimal detectable change is 9 points. UPDATED OBJECTIVE FINDINGS:   
ssment:   
      Slightly flexed posture Gait with decreased ct ROM:   
 Date: 
21 Date: 
61 Right Left Right Left Lumbar flexion 45  50 Lumbar extension 0  0 Side bending 50% 50% 50% 50% Rotation 50% 50% 75% 7% Hip ER 45 30 50 50 Hip flex 105 105 115 120 Hamstring flexibility moderate moderate Strength:   
 Date: 
21 Date: 
21 Right Left Right Left Hip flexion 4 5- 4+ 5 Hip abduction 4- 4 4 4+ Knee extension 5 5 5 5 Knee flexion 4+ 4 5 5 Special Tests:   
      TU.57   SLS right 9 sec   Left 10  Unsteady   21 TUG 12.69 PT Patient Time In/Time Out Time In: 1115 Time Out: 1200 Thank you for this referral, La Shah, PT Referring Physician Signature: Delano Joiner MD No Signature is Required for this note.

## 2021-06-03 NOTE — PROGRESS NOTES
Donnie Anand  : 1933  [place]  Secondary: [address] [facility] at [address]  [facility] [address] [place] [address]  [phone]   [phone]      OUTPATIENT PHYSICAL THERAPY: Daily Treatment Note 6/3/2021  Visit Count:  11    ICD-10: Treatment Diagnosis: M62.81 Generalized weakness  R26.2 Difficulty walking  Precautions/Allergies:   Patient has no known allergies. TREATMENT PLAN:  Effective Dates: 2021 TO 2021 (60 days). Frequency/Duration: 2 times a week for 60 Day(s) MEDICAL/REFERRING DIAGNOSIS:  General weakness [R53.1]   DATE OF ONSET:   REFERRING PHYSICIAN: Eliceo Hobbs MD MD Orders: Evaluate and treat  Return MD Appointment: TBD       Pre-treatment Symptoms/Complaints:  Mr. Kristina Rodas reports having more low back pain this morning upon waking. Taking ibuprofen to manage pain  Pain: Initial: Pain Intensity 1: 5 10 Post Session:  3/10    Medications Last Reviewed:  6/3/2021  Updated Objective Findings:  None Today  TREATMENT:     THERAPEUTIC EXERCISE: (25 minutes):  Exercises per grid below to improve mobility and strength. Required minimal verbal and tactile cues to promote proper body alignment. Progressed range and repetitions as indicated. Date:  21 Date:  21 Date:  21 Date:  21 Date:  21 Date:  21 Date:  6/3/21   Activity/Exercise          SKTC 2 x 20 sec B 2 x 20 sec  3x 20 sec; B 3 x 20 secs; B 3 x 20 secs; B 3 x 20 sec;  B   LTR 10x  10x 10x 10x 10x 10x   DKTC 10 x 5 secs with ball 5 x 10 secs with ball  10 x with ball 10x with ball 10 x with ball 10x with ball   HEP/patient education      5' 3   SAQ 1 x 10    2 x 10 1.5# ;B 2 x 10    Supine march 2 x 10         LAQ 2 x 10         Hamstring stretch 3 x 20 sec B   3 x 20 sec; B      Nustep 10' for endurance 10'  L2  10' L2 10' L2 10' L2 10' L2   Heel raises 2 x 10 2 x 10 on airex 2 x 10 on airex 2 x 10 on airex 2 x 10    Lateral walking  6 x 15 YTB in // bars 6 x 15 RTB in // bars 6 x 15 RTB in // bars 6 x 15 RTB in // bars     SLS on airex pad 1 x 10 B 1 x 10 B 2 x 10; B Hip abd 2 x 10      Tandem walking           High march walk  4 x 15 ft 2 x 15ft YTB       Retro walking          Calf stretch   3 x 20 sec        Deep breathing   2 x 10 with cues for sternal lift       SLR     2 x 10; B 2 x 10: B 2 x 10; B   Bridge      2 x 5    Sit to stand squat      2 x 5 2 x 5   Hip adduction with ball squeeze 2 x 10          Hip abduction  2 x 10 RTB         Step ups  2 x 10 6 ' step 2 x 10 6' step 2 x 10 6'\" step 2 x 10 6' step; B     Fitter  A/P, M/L tipping and dynamic balance x 6' A/P, M/L tipping and dynamic balance x 8'       Seated lumbar flexion stretch   10x center w ball  5x oblique;B  10x center w ball  5x oblique;B  10x center w ball  5x oblique;B   10x     MANUAL THERAPY: (20 minutes): Joint mobilization and Soft tissue mobilization was utilized and necessary because of the patient's restricted joint motion and loss of articular motion. STM/MFR right  lumbar paraspinals  Hip lateral and inferior distraction mobs grade IV bilateral  LAT bilateral    MOIST HOT PACK: (0 minutes) for muscle relaxation and analgesia (no charge)    [employer] PortalAccess Code: [email or web address]  URL: [email or web address]: 04/26/2021Prepared by: La Zaragoza-Leonorxercaugustus   Supine Lower Trunk Rotation - 1 x daily - 7 x weekly - 1 sets - 10 reps - 10 hold   Hooklying Single Knee to Chest - 1 x daily - 7 x weekly - 3 reps - 20 hold   Supine Double Knee to Chest - 1 x daily - 7 x weekly - 3 reps - 20 hold   Supine Pelvic Tilt - 1 x daily - 7 x weekly - 1 sets - 5 reps - 5 hold    Treatment/Session Summary:    · Response to Treatment:  Patient did well with exercises and demonstrated good knowledge and technique with HEP.   · Communication/Consultation:  HEP  · Equipment provided today:  HEP  · Recommendations/Intent for next treatment session: Next visit will focus on strengthening,, balance and proprioceptive training    Total Treatment Billable Duration:  45 minutes (25 minutes there ex, 20 minutes manual)  PT Patient Time In/Time Out  Time In: 1115  Time Out: [address], PT       Future Appointments   Date Time Provider Tiago Roberson   6/10/2021 11:10 AM Monty Funes MD [facility]   6/11/2021  1:00 PM REMOTE PACER 34 GVL [facility]   6/18/2021 11:45 AM Yu Cruz MD [facility]   11/15/2021  1:00 PM VSA ULTRASOUND 1 [facility] BS VSA   11/15/2021  1:30 PM Standard, Velora Shone, NP [facility]

## 2021-10-20 ENCOUNTER — HOSPITAL ENCOUNTER (OUTPATIENT)
Dept: LAB | Age: 86
Discharge: HOME | End: 2021-10-20
Payer: MEDICARE

## 2021-10-20 DIAGNOSIS — J44.0 CHRONIC OBSTRUCTIVE PULMONARY DISEASE WITH ACUTE LOWER RESPIRATORY INFECTION (HCC): ICD-10-CM

## 2021-10-20 DIAGNOSIS — Z95.0 PACEMAKER: ICD-10-CM

## 2021-10-20 DIAGNOSIS — I48.0 PAROXYSMAL ATRIAL FIBRILLATION (HCC): ICD-10-CM

## 2021-10-20 LAB
ANION GAP SERPL CALC-SCNC: 6 MMOL/L (ref 7–16)
BASOPHILS # BLD: 0.1 K/UL (ref 0–0.2)
BASOPHILS NFR BLD: 1 % (ref 0–2)
BUN SERPL-MCNC: 28 MG/DL (ref 8–23)
CALCIUM SERPL-MCNC: 9.8 MG/DL (ref 8.3–10.4)
CHLORIDE SERPL-SCNC: 107 MMOL/L (ref 98–107)
CO2 SERPL-SCNC: 24 MMOL/L (ref 21–32)
CREAT SERPL-MCNC: 2.08 MG/DL (ref 0.8–1.5)
DIFFERENTIAL METHOD BLD: ABNORMAL
EOSINOPHIL # BLD: 0.6 K/UL (ref 0–0.8)
EOSINOPHIL NFR BLD: 7 % (ref 0.5–7.8)
ERYTHROCYTE [DISTWIDTH] IN BLOOD BY AUTOMATED COUNT: 13.5 % (ref 11.9–14.6)
GLUCOSE SERPL-MCNC: 97 MG/DL (ref 65–100)
HCT VFR BLD AUTO: 43.8 % (ref 41.1–50.3)
HGB BLD-MCNC: 14 G/DL (ref 13.6–17.2)
IMM GRANULOCYTES # BLD AUTO: 0.1 K/UL (ref 0–0.5)
IMM GRANULOCYTES NFR BLD AUTO: 1 % (ref 0–5)
LYMPHOCYTES # BLD: 1.2 K/UL (ref 0.5–4.6)
LYMPHOCYTES NFR BLD: 16 % (ref 13–44)
MAGNESIUM SERPL-MCNC: 2.1 MG/DL (ref 1.8–2.4)
MCH RBC QN AUTO: 30.4 PG (ref 26.1–32.9)
MCHC RBC AUTO-ENTMCNC: 32 G/DL (ref 31.4–35)
MCV RBC AUTO: 95.2 FL (ref 79.6–97.8)
MONOCYTES # BLD: 0.7 K/UL (ref 0.1–1.3)
MONOCYTES NFR BLD: 9 % (ref 4–12)
NEUTS SEG # BLD: 5.1 K/UL (ref 1.7–8.2)
NEUTS SEG NFR BLD: 67 % (ref 43–78)
NRBC # BLD: 0 K/UL (ref 0–0.2)
PLATELET # BLD AUTO: 354 K/UL (ref 150–450)
PMV BLD AUTO: 9.2 FL (ref 9.4–12.3)
POTASSIUM SERPL-SCNC: 5 MMOL/L (ref 3.5–5.1)
RBC # BLD AUTO: 4.6 M/UL (ref 4.23–5.6)
SODIUM SERPL-SCNC: 137 MMOL/L (ref 136–145)
T4 FREE SERPL-MCNC: 1.2 NG/DL (ref 0.9–1.8)
TSH SERPL DL<=0.005 MIU/L-ACNC: 2.26 UIU/ML (ref 0.36–3.74)
WBC # BLD AUTO: 7.6 K/UL (ref 4.3–11.1)

## 2021-10-20 PROCEDURE — 80048 BASIC METABOLIC PNL TOTAL CA: CPT

## 2021-10-20 PROCEDURE — 83735 ASSAY OF MAGNESIUM: CPT

## 2021-10-20 PROCEDURE — 36415 COLL VENOUS BLD VENIPUNCTURE: CPT

## 2021-10-20 PROCEDURE — 85025 COMPLETE CBC W/AUTO DIFF WBC: CPT

## 2021-10-20 PROCEDURE — 84443 ASSAY THYROID STIM HORMONE: CPT

## 2021-10-20 PROCEDURE — 84439 ASSAY OF FREE THYROXINE: CPT

## 2022-01-04 ENCOUNTER — HOSPITAL ENCOUNTER (OUTPATIENT)
Dept: LAB | Age: 87
Discharge: HOME | End: 2022-01-04
Payer: MEDICARE

## 2022-01-04 DIAGNOSIS — I48.0 PAROXYSMAL ATRIAL FIBRILLATION (HCC): ICD-10-CM

## 2022-01-04 DIAGNOSIS — E78.2 MIXED HYPERLIPIDEMIA: ICD-10-CM

## 2022-01-04 DIAGNOSIS — R00.1 BRADYCARDIA: ICD-10-CM

## 2022-01-04 DIAGNOSIS — I10 ESSENTIAL HYPERTENSION: ICD-10-CM

## 2022-01-04 DIAGNOSIS — I65.23 CAROTID STENOSIS, ASYMPTOMATIC, BILATERAL: ICD-10-CM

## 2022-01-04 LAB
ANION GAP SERPL CALC-SCNC: 6 MMOL/L (ref 7–16)
BASOPHILS # BLD: 0.1 K/UL (ref 0–0.2)
BASOPHILS NFR BLD: 1 % (ref 0–2)
BUN SERPL-MCNC: 28 MG/DL (ref 8–23)
CALCIUM SERPL-MCNC: 9.7 MG/DL (ref 8.3–10.4)
CHLORIDE SERPL-SCNC: 106 MMOL/L (ref 98–107)
CO2 SERPL-SCNC: 25 MMOL/L (ref 21–32)
CREAT SERPL-MCNC: 1.93 MG/DL (ref 0.8–1.5)
DIFFERENTIAL METHOD BLD: ABNORMAL
EOSINOPHIL # BLD: 0.3 K/UL (ref 0–0.8)
EOSINOPHIL NFR BLD: 4 % (ref 0.5–7.8)
ERYTHROCYTE [DISTWIDTH] IN BLOOD BY AUTOMATED COUNT: 13.5 % (ref 11.9–14.6)
GLUCOSE SERPL-MCNC: 100 MG/DL (ref 65–100)
HCT VFR BLD AUTO: 37.1 % (ref 41.1–50.3)
HGB BLD-MCNC: 11.7 G/DL (ref 13.6–17.2)
IMM GRANULOCYTES # BLD AUTO: 0 K/UL (ref 0–0.5)
IMM GRANULOCYTES NFR BLD AUTO: 0 % (ref 0–5)
LYMPHOCYTES # BLD: 1.3 K/UL (ref 0.5–4.6)
LYMPHOCYTES NFR BLD: 22 % (ref 13–44)
MAGNESIUM SERPL-MCNC: 2.1 MG/DL (ref 1.8–2.4)
MCH RBC QN AUTO: 29.8 PG (ref 26.1–32.9)
MCHC RBC AUTO-ENTMCNC: 31.5 G/DL (ref 31.4–35)
MCV RBC AUTO: 94.6 FL (ref 79.6–97.8)
MONOCYTES # BLD: 0.6 K/UL (ref 0.1–1.3)
MONOCYTES NFR BLD: 10 % (ref 4–12)
NEUTS SEG # BLD: 3.7 K/UL (ref 1.7–8.2)
NEUTS SEG NFR BLD: 62 % (ref 43–78)
NRBC # BLD: 0 K/UL (ref 0–0.2)
PLATELET # BLD AUTO: 224 K/UL (ref 150–450)
PMV BLD AUTO: 10 FL (ref 9.4–12.3)
POTASSIUM SERPL-SCNC: 4.8 MMOL/L (ref 3.5–5.1)
RBC # BLD AUTO: 3.92 M/UL (ref 4.23–5.6)
SODIUM SERPL-SCNC: 137 MMOL/L (ref 138–145)
WBC # BLD AUTO: 6 K/UL (ref 4.3–11.1)

## 2022-01-04 PROCEDURE — 80048 BASIC METABOLIC PNL TOTAL CA: CPT

## 2022-01-04 PROCEDURE — 36415 COLL VENOUS BLD VENIPUNCTURE: CPT

## 2022-01-04 PROCEDURE — 85025 COMPLETE CBC W/AUTO DIFF WBC: CPT

## 2022-01-04 PROCEDURE — 83735 ASSAY OF MAGNESIUM: CPT

## 2022-01-04 NOTE — PROGRESS NOTES
Called pt with stable lab result & confirmed instructions on lasix & potassium. Pt voiced understanding.